# Patient Record
Sex: FEMALE | Race: WHITE | Employment: FULL TIME | URBAN - METROPOLITAN AREA
[De-identification: names, ages, dates, MRNs, and addresses within clinical notes are randomized per-mention and may not be internally consistent; named-entity substitution may affect disease eponyms.]

---

## 2017-01-04 ENCOUNTER — OFFICE VISIT (OUTPATIENT)
Dept: SURGERY | Facility: CLINIC | Age: 47
End: 2017-01-04

## 2017-01-04 ENCOUNTER — HOSPITAL ENCOUNTER (OUTPATIENT)
Dept: GENERAL RADIOLOGY | Facility: HOSPITAL | Age: 47
Discharge: HOME | End: 2017-01-04
Attending: NURSE PRACTITIONER
Payer: COMMERCIAL

## 2017-01-04 VITALS — SYSTOLIC BLOOD PRESSURE: 122 MMHG | RESPIRATION RATE: 18 BRPM | HEART RATE: 84 BPM | DIASTOLIC BLOOD PRESSURE: 78 MMHG

## 2017-01-04 DIAGNOSIS — M50.30 DDD (DEGENERATIVE DISC DISEASE), CERVICAL: ICD-10-CM

## 2017-01-04 DIAGNOSIS — Z98.1 S/P CERVICAL SPINAL FUSION: ICD-10-CM

## 2017-01-04 DIAGNOSIS — Z98.1 S/P CERVICAL SPINAL FUSION: Primary | ICD-10-CM

## 2017-01-04 DIAGNOSIS — G95.9 CERVICAL MYELOPATHY (HCC): ICD-10-CM

## 2017-01-04 DIAGNOSIS — M79.18 MYOFASCIAL PAIN SYNDROME, CERVICAL: ICD-10-CM

## 2017-01-04 PROCEDURE — 99024 POSTOP FOLLOW-UP VISIT: CPT | Performed by: NURSE PRACTITIONER

## 2017-01-04 PROCEDURE — 72040 X-RAY EXAM NECK SPINE 2-3 VW: CPT

## 2017-01-04 RX ORDER — ACETAMINOPHEN 500 MG
500 TABLET ORAL EVERY 6 HOURS PRN
COMMUNITY
End: 2017-02-16

## 2017-01-04 NOTE — PROGRESS NOTES
Patient f/u after completing xrays. Patient states she has had improvements since last visit. Patient stated she gets muscle soreness daily towards the end of the day across shoulders.

## 2017-01-04 NOTE — PROGRESS NOTES
Neurosurgery Cervical  Follow up      Maria Del Carmen Davidson PCP:  Jesus Infante MD    1970 MRN [id number]         Patient presents with:  Post-Op: f/u with xrays  Post-Op: 10/23/16 Anterior Cervical discectomy and fusion cervical4-7, partial vertebrectomy Extension   Finger extension  Intrinsics    Right          5        5         5           5  5  5  4   Left          5        5         5          5 5  5 5      Lower extremity strength:       Iliopsoas   Hamstrings    Gallo Caret D-flexion  P-flexion    [employer] [address], [address] [facility], [address]  [phone]  1/4/2017    I have seen and examined the patient along with AICHA Peraza. I agree with the assessment, plan, and examination.   Imaging was reviewed the patient shows adequate fusion construct in the b

## 2017-01-04 NOTE — PATIENT INSTRUCTIONS
Refill policies:    • Allow 2 business days for refills; controlled substances may take longer.   • Contact your pharmacy at least 5 days prior to running out of medication and have them send an electronic request or submit request through the “request re your physician has recommended that you have a procedure or additional testing performed. [facility]) will contact your insurance carrier to obtain pre-certification or prior authorization.     Unfortunately, DAWSON has seen an increas

## 2017-01-09 ENCOUNTER — OFFICE VISIT (OUTPATIENT)
Dept: PHYSICAL THERAPY | Age: 47
End: 2017-01-09
Attending: NURSE PRACTITIONER
Payer: COMMERCIAL

## 2017-01-09 DIAGNOSIS — G95.9 CERVICAL MYELOPATHY (HCC): ICD-10-CM

## 2017-01-09 DIAGNOSIS — Z98.1 S/P CERVICAL SPINAL FUSION: Primary | ICD-10-CM

## 2017-01-09 DIAGNOSIS — M50.30 DDD (DEGENERATIVE DISC DISEASE), CERVICAL: ICD-10-CM

## 2017-01-09 PROCEDURE — 97110 THERAPEUTIC EXERCISES: CPT | Performed by: PHYSICAL THERAPIST

## 2017-01-09 PROCEDURE — 97162 PT EVAL MOD COMPLEX 30 MIN: CPT | Performed by: PHYSICAL THERAPIST

## 2017-01-09 NOTE — PROGRESS NOTES
SPINE EVALUATION:   Referring Physician: Dr. Lexus Klein  Diagnosis: S/P cervical spinal fusion (Z98.1), Cervical myelopathy (G95.9), DDD (degenerative disc disease), cervical (M50.30)     Date of Service: 1/9/2017     PATIENT SUMMARY   Ivett Kimberlyn is a 40 ye Cervical ROM:    Evaluation   Flexion 38   Extension 36    Right          Left   Side Bending  26              32   Rotation 48              47     Shoulder Screen:  WNL    Palpation: Palpable tenderness in the upper trapezius, levator and cervical par include: Manual Therapy; Therapeutic Exercises; Neuromuscular Re-education; Therapeutic Activity; Electrical Stim; modalities as needed.     Education or treatment limitation: None  Rehab Potential:good    Physical Functional status measure:  51     Patient

## 2017-01-11 ENCOUNTER — OFFICE VISIT (OUTPATIENT)
Dept: PHYSICAL THERAPY | Age: 47
End: 2017-01-11
Attending: NURSE PRACTITIONER
Payer: COMMERCIAL

## 2017-01-11 DIAGNOSIS — M50.30 DDD (DEGENERATIVE DISC DISEASE), CERVICAL: ICD-10-CM

## 2017-01-11 DIAGNOSIS — G95.9 CERVICAL MYELOPATHY (HCC): ICD-10-CM

## 2017-01-11 DIAGNOSIS — Z98.1 S/P CERVICAL SPINAL FUSION: Primary | ICD-10-CM

## 2017-01-11 PROCEDURE — 97112 NEUROMUSCULAR REEDUCATION: CPT | Performed by: PHYSICAL THERAPIST

## 2017-01-11 PROCEDURE — 97140 MANUAL THERAPY 1/> REGIONS: CPT | Performed by: PHYSICAL THERAPIST

## 2017-01-11 PROCEDURE — 97110 THERAPEUTIC EXERCISES: CPT | Performed by: PHYSICAL THERAPIST

## 2017-01-11 NOTE — PROGRESS NOTES
Referring Physician: Dr. Kira Alfaro    Dx: S/P cervical spinal fusion (Z98.1), Cervical myelopathy (G95.9), DDD (degenerative disc disease), cervical (M50.30)           Authorized # of Visits:  12         Next MD visit: none scheduled  Fall Risk: standard Charges: TE x 1, NMR x 1, MT x 1       Total Timed Treatment: 40 min  Total Treatment Time: 42 min

## 2017-01-16 ENCOUNTER — OFFICE VISIT (OUTPATIENT)
Dept: PHYSICAL THERAPY | Age: 47
End: 2017-01-16
Attending: NURSE PRACTITIONER
Payer: COMMERCIAL

## 2017-01-16 DIAGNOSIS — G95.9 CERVICAL MYELOPATHY (HCC): ICD-10-CM

## 2017-01-16 DIAGNOSIS — M50.30 DDD (DEGENERATIVE DISC DISEASE), CERVICAL: ICD-10-CM

## 2017-01-16 DIAGNOSIS — Z98.1 S/P CERVICAL SPINAL FUSION: Primary | ICD-10-CM

## 2017-01-16 PROCEDURE — 97140 MANUAL THERAPY 1/> REGIONS: CPT | Performed by: PHYSICAL THERAPIST

## 2017-01-16 PROCEDURE — 97112 NEUROMUSCULAR REEDUCATION: CPT | Performed by: PHYSICAL THERAPIST

## 2017-01-16 PROCEDURE — 97110 THERAPEUTIC EXERCISES: CPT | Performed by: PHYSICAL THERAPIST

## 2017-01-16 NOTE — PROGRESS NOTES
Referring Physician: Dr. Desiree Hi    Dx: S/P cervical spinal fusion (Z98.1), Cervical myelopathy (G95.9), DDD (degenerative disc disease), cervical (M50.30)           Authorized # of Visits:  12         Next MD visit: none scheduled  Fall Risk: standard Tandem walking x 1 L X fwd / bwd         FSU & SLS with scaption x 10, 3 s          Charges: TE x 1, NMR x 1, MT x 1       Total Timed Treatment: 42 min  Total Treatment Time: 48 min

## 2017-01-18 ENCOUNTER — OFFICE VISIT (OUTPATIENT)
Dept: PHYSICAL THERAPY | Age: 47
End: 2017-01-18
Attending: NURSE PRACTITIONER
Payer: COMMERCIAL

## 2017-01-18 DIAGNOSIS — G95.9 CERVICAL MYELOPATHY (HCC): ICD-10-CM

## 2017-01-18 DIAGNOSIS — M50.30 DDD (DEGENERATIVE DISC DISEASE), CERVICAL: ICD-10-CM

## 2017-01-18 DIAGNOSIS — Z98.1 S/P CERVICAL SPINAL FUSION: Primary | ICD-10-CM

## 2017-01-18 PROCEDURE — 97140 MANUAL THERAPY 1/> REGIONS: CPT | Performed by: PHYSICAL THERAPIST

## 2017-01-18 PROCEDURE — 97112 NEUROMUSCULAR REEDUCATION: CPT | Performed by: PHYSICAL THERAPIST

## 2017-01-18 PROCEDURE — 97110 THERAPEUTIC EXERCISES: CPT | Performed by: PHYSICAL THERAPIST

## 2017-01-18 NOTE — PROGRESS NOTES
Referring Physician: Dr. Garg Speak    Dx: S/P cervical spinal fusion (Z98.1), Cervical myelopathy (G95.9), DDD (degenerative disc disease), cervical (M50.30)           Authorized # of Visits:  12         Next MD visit: none scheduled  Fall Risk: standard x 15' x x        isometric in all direction for cervical x x       Gentle passive UT stretch (B) x x       Tandem walking x 1 L X fwd / bwd -        FSU & SLS with scaption x 10, 3 s -         Charges: TE x 1, NMR x 1, MT x 1       Total Timed Treatment: 4

## 2017-01-23 ENCOUNTER — TELEPHONE (OUTPATIENT)
Dept: SURGERY | Facility: CLINIC | Age: 47
End: 2017-01-23

## 2017-01-23 DIAGNOSIS — Z98.1 S/P CERVICAL SPINAL FUSION: Primary | ICD-10-CM

## 2017-01-23 NOTE — TELEPHONE ENCOUNTER
Spoke with  who would like patient to get cervical xray AP and lat and a plain cervical MRI. Patient will need to f/u in the office to review imaging and be evaluated.     Patient was informed of the above and will call central scheduling to get cooper

## 2017-01-23 NOTE — TELEPHONE ENCOUNTER
Spoke with patient who stated the last few days she has been experiencing numbness/tingling to her arms which has not happened since before surgery.  (Surgery was a Anterior Cervical Fusion C4-C5, C5-C6, C6-C7 on 10/23/16 She also has been experiencing weak

## 2017-01-24 ENCOUNTER — TELEPHONE (OUTPATIENT)
Dept: NEUROLOGY | Facility: CLINIC | Age: 47
End: 2017-01-24

## 2017-01-25 ENCOUNTER — HOSPITAL ENCOUNTER (OUTPATIENT)
Dept: MRI IMAGING | Age: 47
Discharge: HOME | End: 2017-01-25
Attending: NEUROLOGICAL SURGERY
Payer: COMMERCIAL

## 2017-01-25 ENCOUNTER — OFFICE VISIT (OUTPATIENT)
Dept: SURGERY | Facility: CLINIC | Age: 47
End: 2017-01-25

## 2017-01-25 ENCOUNTER — APPOINTMENT (OUTPATIENT)
Dept: PHYSICAL THERAPY | Age: 47
End: 2017-01-25
Attending: NURSE PRACTITIONER
Payer: COMMERCIAL

## 2017-01-25 ENCOUNTER — TELEPHONE (OUTPATIENT)
Dept: SURGERY | Facility: CLINIC | Age: 47
End: 2017-01-25

## 2017-01-25 ENCOUNTER — HOSPITAL ENCOUNTER (OUTPATIENT)
Dept: GENERAL RADIOLOGY | Age: 47
Discharge: HOME | End: 2017-01-25
Attending: NEUROLOGICAL SURGERY
Payer: COMMERCIAL

## 2017-01-25 ENCOUNTER — HOSPITAL ENCOUNTER (OUTPATIENT)
Dept: MRI IMAGING | Facility: HOSPITAL | Age: 47
Discharge: HOME | End: 2017-01-25
Attending: NEUROLOGICAL SURGERY
Payer: COMMERCIAL

## 2017-01-25 VITALS
DIASTOLIC BLOOD PRESSURE: 78 MMHG | BODY MASS INDEX: 28.93 KG/M2 | SYSTOLIC BLOOD PRESSURE: 122 MMHG | HEIGHT: 66 IN | RESPIRATION RATE: 15 BRPM | WEIGHT: 180 LBS | HEART RATE: 78 BPM

## 2017-01-25 VITALS
SYSTOLIC BLOOD PRESSURE: 120 MMHG | WEIGHT: 180 LBS | HEART RATE: 84 BPM | RESPIRATION RATE: 14 BRPM | DIASTOLIC BLOOD PRESSURE: 68 MMHG | BODY MASS INDEX: 28.93 KG/M2 | HEIGHT: 66 IN

## 2017-01-25 DIAGNOSIS — Z98.1 S/P CERVICAL SPINAL FUSION: ICD-10-CM

## 2017-01-25 DIAGNOSIS — M54.12 CERVICAL RADICULOPATHY: Primary | ICD-10-CM

## 2017-01-25 DIAGNOSIS — M47.812 FACET ARTHROPATHY, CERVICAL: ICD-10-CM

## 2017-01-25 DIAGNOSIS — S14.105D SPINAL CORD INJURY AT C5-C7 LEVEL WITHOUT INJURY OF SPINAL BONE, SUBSEQUENT ENCOUNTER (HCC): ICD-10-CM

## 2017-01-25 DIAGNOSIS — R53.1 WEAKNESS: ICD-10-CM

## 2017-01-25 DIAGNOSIS — Z98.890 POST-OPERATIVE STATE: ICD-10-CM

## 2017-01-25 DIAGNOSIS — S14.105D SPINAL CORD INJURY AT C5-C7 LEVEL WITHOUT INJURY OF SPINAL BONE, SUBSEQUENT ENCOUNTER (HCC): Primary | ICD-10-CM

## 2017-01-25 DIAGNOSIS — G95.9 CERVICAL MYELOPATHY (HCC): ICD-10-CM

## 2017-01-25 PROCEDURE — 72141 MRI NECK SPINE W/O DYE: CPT

## 2017-01-25 PROCEDURE — 99204 OFFICE O/P NEW MOD 45 MIN: CPT | Performed by: ANESTHESIOLOGY

## 2017-01-25 PROCEDURE — 99024 POSTOP FOLLOW-UP VISIT: CPT | Performed by: NEUROLOGICAL SURGERY

## 2017-01-25 PROCEDURE — A9575 INJ GADOTERATE MEGLUMI 0.1ML: HCPCS | Performed by: NEUROLOGICAL SURGERY

## 2017-01-25 PROCEDURE — 72040 X-RAY EXAM NECK SPINE 2-3 VW: CPT

## 2017-01-25 PROCEDURE — 72156 MRI NECK SPINE W/O & W/DYE: CPT

## 2017-01-25 RX ORDER — ORPHENADRINE CITRATE 100 MG/1
100 TABLET, EXTENDED RELEASE ORAL 2 TIMES DAILY PRN
COMMUNITY
End: 2017-03-08

## 2017-01-25 NOTE — PROGRESS NOTES
PT is here to follow up after imaging due to new symptoms  Weakness in leg   Arms numb and tingling particularly when laying down  Dropping things

## 2017-01-25 NOTE — TELEPHONE ENCOUNTER
Spoke with MRI department to clarify order. Informed them that per TE 1/23/17 Seferino Peabody had wanted a plain MRI cervical. No further questions.

## 2017-01-25 NOTE — TELEPHONE ENCOUNTER
Pt is scheduled, MRI Cervical ordered by Dr. Shannon Fuller has been approved by your insurance, authorization # [id number] and is approved through 1.24.17-2.22.17. Please have procedure completed before 2.22.17.  Schedule at your convenience with the central schedul

## 2017-01-25 NOTE — PATIENT INSTRUCTIONS
Refill policies:    • Allow 2 business days for refills; controlled substances may take longer.   • Contact your pharmacy at least 5 days prior to running out of medication and have them send an electronic request or submit request through the “request re your physician has recommended that you have a procedure or additional testing performed. [facility]) will contact your insurance carrier to obtain pre-certification or prior authorization.     Unfortunately, DAWSON has seen an increas

## 2017-01-25 NOTE — PROGRESS NOTES
HPI:    Patient ID: Shikha Talavera is a 40year old female. HPI    Review of Systems         Current Outpatient Prescriptions:  Orphenadrine Citrate  MG Oral Tablet 12 Hr Take 100 mg by mouth 2 (two) times daily as needed.  Disp:  Rfl:    acetamino

## 2017-01-25 NOTE — PATIENT INSTRUCTIONS
Refill policies:    • Allow 2 business days for refills; controlled substances may take longer.   • Contact your pharmacy at least 5 days prior to running out of medication and have them send an electronic request or submit request through the “request re your physician has recommended that you have a procedure or additional testing performed. [facility]) will contact your insurance carrier to obtain pre-certification or prior authorization.     Unfortunately, DAWSON has seen an increas

## 2017-01-26 ENCOUNTER — OFFICE VISIT (OUTPATIENT)
Dept: NEUROLOGY | Facility: CLINIC | Age: 47
End: 2017-01-26

## 2017-01-26 ENCOUNTER — OFFICE VISIT (OUTPATIENT)
Dept: PHYSICAL THERAPY | Age: 47
End: 2017-01-26
Attending: NURSE PRACTITIONER
Payer: COMMERCIAL

## 2017-01-26 ENCOUNTER — APPOINTMENT (OUTPATIENT)
Dept: LAB | Age: 47
End: 2017-01-26
Attending: Other
Payer: COMMERCIAL

## 2017-01-26 VITALS
RESPIRATION RATE: 16 BRPM | DIASTOLIC BLOOD PRESSURE: 80 MMHG | HEART RATE: 72 BPM | HEIGHT: 66 IN | WEIGHT: 180 LBS | BODY MASS INDEX: 28.93 KG/M2 | SYSTOLIC BLOOD PRESSURE: 118 MMHG

## 2017-01-26 DIAGNOSIS — M47.12 CERVICAL SPONDYLOSIS WITH MYELOPATHY: ICD-10-CM

## 2017-01-26 DIAGNOSIS — R29.898 WEAKNESS OF RIGHT LOWER EXTREMITY: ICD-10-CM

## 2017-01-26 DIAGNOSIS — M47.12 CERVICAL SPONDYLOSIS WITH MYELOPATHY: Primary | ICD-10-CM

## 2017-01-26 LAB
ERYTHROCYTE [DISTWIDTH] IN BLOOD BY AUTOMATED COUNT: 12.7 % (ref 11.5–16)
HCT VFR BLD AUTO: 39.8 % (ref 34–50)
HGB BLD-MCNC: 13.7 G/DL (ref 12–16)
MCH RBC QN AUTO: 29.7 PG (ref 27–33.2)
MCHC RBC AUTO-ENTMCNC: 34.4 G/DL (ref 31–37)
MCV RBC AUTO: 86.3 FL (ref 81–100)
PLATELET # BLD AUTO: 255 10(3)UL (ref 150–450)
RBC # BLD AUTO: 4.61 X10(6)UL (ref 3.8–5.1)
RED CELL DISTRIBUTION WIDTH-SD: 39.7 FL (ref 35.1–46.3)
WBC # BLD AUTO: 5.3 X10(3) UL (ref 4–13)

## 2017-01-26 PROCEDURE — 84165 PROTEIN E-PHORESIS SERUM: CPT

## 2017-01-26 PROCEDURE — 36415 COLL VENOUS BLD VENIPUNCTURE: CPT

## 2017-01-26 PROCEDURE — 83883 ASSAY NEPHELOMETRY NOT SPEC: CPT

## 2017-01-26 PROCEDURE — 86334 IMMUNOFIX E-PHORESIS SERUM: CPT

## 2017-01-26 PROCEDURE — 97112 NEUROMUSCULAR REEDUCATION: CPT | Performed by: PHYSICAL THERAPIST

## 2017-01-26 PROCEDURE — 97140 MANUAL THERAPY 1/> REGIONS: CPT | Performed by: PHYSICAL THERAPIST

## 2017-01-26 PROCEDURE — 97110 THERAPEUTIC EXERCISES: CPT | Performed by: PHYSICAL THERAPIST

## 2017-01-26 PROCEDURE — 82164 ANGIOTENSIN I ENZYME TEST: CPT

## 2017-01-26 PROCEDURE — 99215 OFFICE O/P EST HI 40 MIN: CPT | Performed by: OTHER

## 2017-01-26 PROCEDURE — 85027 COMPLETE CBC AUTOMATED: CPT

## 2017-01-26 PROCEDURE — 86618 LYME DISEASE ANTIBODY: CPT

## 2017-01-26 NOTE — PROGRESS NOTES
States symptoms improved after surgery but now are returning. C/o numbness, tingling and weakness in bilateral arms. States \"I am dropping things\". Also numbness right leg.

## 2017-01-26 NOTE — PROGRESS NOTES
[facility] in [place]  Neurology - Clinic Follow up  2017, 10:29 AM     Shikha Talavera Patient Status:  No patient class for patient encounter    1970 MRN [id number]   Location [unfilled] PCP Ang Lynn MD     Patient pr hours as needed for Pain., Disp: , Rfl:   •  Desvenlafaxine Succinate ER (PRISTIQ) 50 MG Oral Tablet 24 Hr, Take 1 tablet by mouth daily. , Disp: 90 tablet, Rfl: 4    REVIEW OF SYSTEMS:  General: denies any fever or chills.      Eye: no pain or redness;  Ear tendon reflexes were 3 at the biceps, brachioradialis, triceps, with both pectoral spread and finger flexion spread with brachioradialis b/l, 3+ knee jerk, and ankle jerk.  Plantar responses were flexor bilaterally.    Sensory exam revealed normal light renny imaging with neuroradiology. Will check ACE, SPEP, and Lyme, as well as obtain CSF testing. No prescriptions requested or ordered in this encounter       We discussed in depth regarding the diagnosis, prognosis, treatment.   The patient was given ample o

## 2017-01-26 NOTE — PROGRESS NOTES
Referring Physician: Dr. Satya Medina    Dx: S/P cervical spinal fusion (Z98.1), Cervical myelopathy (G95.9), DDD (degenerative disc disease), cervical (M50.30)           Authorized # of Visits:  12         Next MD visit: none scheduled  Fall Risk: standard Date:   Tx#: 6/ Date: Tx#: 7/ Date:    Tx#: 8/   TRX rows x 20 x X and TRX biceps curls SL  X 20 TRX rows, biceps curls x 20      Wall snow julieta stretch x 5 - edu- pacing and activity modification Edu- see assessment      Wall - W x 20 X  x X 20        s

## 2017-01-26 NOTE — PATIENT INSTRUCTIONS
Refill policies:    • Allow 2 business days for refills; controlled substances may take longer.   • Contact your pharmacy at least 5 days prior to running out of medication and have them send an electronic request or submit request through the “request re your physician has recommended that you have a procedure or additional testing performed. [facility]) will contact your insurance carrier to obtain pre-certification or prior authorization.     Unfortunately, DAWSON has seen an increas

## 2017-01-26 NOTE — PROGRESS NOTES
[facility]   Outpatient Neurological Surgery Follow Up    Frandy Mg  : 1970  PCP: Stevan Jaffe MD  Referring Provider: No ref.  provider found    REASON FOR VISIT:Patient presents with:  Weakness: follow up after Tromner sign. She does have 3 beats of clonus and right ankle jerk testing and 2 beats in the left ankle jerk testing. She continues to have hyperreflexia in the bilateral lower extremities graded as 3 out of 4.   Her deep tendon reflexes in the upper ext manifestation such as demyelinating processes. Multiple sclerosis still should remain on the differential although she does not have the imaging characteristics to meet that diagnosis at this time.   Tumor of the central cervical cord at this level may hav

## 2017-01-27 ENCOUNTER — TELEPHONE (OUTPATIENT)
Dept: SURGERY | Facility: CLINIC | Age: 47
End: 2017-01-27

## 2017-01-27 ENCOUNTER — TELEPHONE (OUTPATIENT)
Dept: NEUROLOGY | Facility: CLINIC | Age: 47
End: 2017-01-27

## 2017-01-27 ENCOUNTER — TELEPHONE (OUTPATIENT)
Dept: FAMILY MEDICINE CLINIC | Facility: CLINIC | Age: 47
End: 2017-01-27

## 2017-01-27 LAB — ANGIOTENSIN CONVERTING ENZYME: 15 U/L

## 2017-01-27 NOTE — TELEPHONE ENCOUNTER
[facility]  and reviewed message below.  stated he reviewed patient's 2nd MRI that was completed on 1/25/17 and stated there was nothing alarming.  Patient's imaging is going to be reviewed on 1/31/17 at neuro-onc conference and

## 2017-01-27 NOTE — TELEPHONE ENCOUNTER
Patient was informed of message below from [facility]. Patient understood and was thankful for the call back. No further questions at this time.

## 2017-01-27 NOTE — TELEPHONE ENCOUNTER
Pt is scheduled,MRA Neck ordered by Dr. Krissy Rdz has been approved by your insurance, authorization # [id number] and is approved through 1.27.17-2.25.17. Please have procedure completed before 2.25.17.  Schedule at your convenience with the central schedulin

## 2017-01-27 NOTE — TELEPHONE ENCOUNTER
Spoke with patient to try to clarify information. Patient stated She saw Spencer Martinez on 1/25/17 and then saw  the following day on 1/26/17. Patient stated as of this morning she is having some pain to the middle of her lower back and is a 5/10.  Sta

## 2017-01-28 LAB — BORRELIA BURGDORFERI ABS, ELISA: 0.8 LIV

## 2017-01-28 NOTE — PROGRESS NOTES
Name: Frandy Mg   : 1970   DOS: 2017     Chief complaint: Neck Pain    History of present illness:  Frandy Mg is a 40year old female complaining of pain in the neck for 3 years. She is status post cervical fusion.   Her presurgical p UNLISTED  10/23/16    Comment C4-7 ACDF, Dr. Salmeron Core      Family History   Problem Relation Age of Onset   • Alcohol and Other Disorders Associated Father      Alcoholism   • Hypertension Other      Family history of        Smoking Status: Never Smoker and trapezius muscles. Flexion of the neck makes the pain better, extention and lateral rotation of the neck makes the pain worse. Elevation of the head makes the pain better, compression of  the head makes the pain worse. Spurling’s test is negative.  Christian N   Wolfe:    N    N   Ankle Clonus:    N                          N  Sensory Examination:    (R)   (L)   LI:      N                          N   L2:      N     N   L3:      N               N   L4:      N                          N   L5:      N

## 2017-01-29 LAB
A/G RATIO: 1.45
ALBUMIN, SERUM: 4.62 G/DL (ref 3.5–4.8)
ALPHA-1 GLOBULIN: 0.2 G/DL (ref 0.1–0.3)
ALPHA-2 GLOBULIN: 0.86 G/DL (ref 0.6–1)
BETA GLOBULIN: 0.8 G/DL (ref 0.7–1.2)
GAMMA GLOBULIN: 1.32 G/DL (ref 0.6–1.6)
KAPPA FREE LIGHT CHAIN: 1.57 MG/DL (ref 0.33–1.94)
KAPPA/LAMBDA FLC RATIO: 0.98 (ref 0.26–1.65)
LAMBDA FREE LIGHT CHAIN: 1.59 MG/DL (ref 0.57–2.63)
TOTAL PROTEIN,SERUM: 7.8 G/DL (ref 6.1–8.3)

## 2017-01-30 ENCOUNTER — APPOINTMENT (OUTPATIENT)
Dept: PHYSICAL THERAPY | Age: 47
End: 2017-01-30
Attending: NURSE PRACTITIONER
Payer: COMMERCIAL

## 2017-01-31 ENCOUNTER — TELEPHONE (OUTPATIENT)
Dept: SURGERY | Facility: CLINIC | Age: 47
End: 2017-01-31

## 2017-01-31 DIAGNOSIS — Z98.1 S/P CERVICAL SPINAL FUSION: Primary | ICD-10-CM

## 2017-01-31 DIAGNOSIS — G95.20 CORD COMPRESSION MYELOPATHY (HCC): ICD-10-CM

## 2017-01-31 NOTE — TELEPHONE ENCOUNTER
Spoke w/ pt regarding recommendations from neuro-onc conference; cervical cord change does not appear to be a mass although will need additional imaging in 2 months to re-evaulate. Cord adequately decompressed with surgery.  Advised her that she should cont

## 2017-02-01 ENCOUNTER — APPOINTMENT (OUTPATIENT)
Dept: PHYSICAL THERAPY | Age: 47
End: 2017-02-01
Attending: NURSE PRACTITIONER
Payer: COMMERCIAL

## 2017-02-01 NOTE — IMAGING NOTE
Left a msg and pt informed to arrive 1 hr early, enter through [facility], NPO x 4 hours, needs a , will be here up to 6 hours total, call back with quesitons.

## 2017-02-02 ENCOUNTER — APPOINTMENT (OUTPATIENT)
Dept: LAB | Facility: HOSPITAL | Age: 47
End: 2017-02-02
Attending: Other
Payer: COMMERCIAL

## 2017-02-02 ENCOUNTER — HOSPITAL ENCOUNTER (OUTPATIENT)
Dept: GENERAL RADIOLOGY | Facility: HOSPITAL | Age: 47
Discharge: HOME | End: 2017-02-02
Attending: Other
Payer: COMMERCIAL

## 2017-02-02 VITALS
OXYGEN SATURATION: 99 % | DIASTOLIC BLOOD PRESSURE: 60 MMHG | SYSTOLIC BLOOD PRESSURE: 102 MMHG | TEMPERATURE: 98 F | WEIGHT: 180 LBS | RESPIRATION RATE: 18 BRPM | HEIGHT: 66 IN | HEART RATE: 95 BPM | BODY MASS INDEX: 28.93 KG/M2

## 2017-02-02 DIAGNOSIS — M47.12 CERVICAL SPONDYLOSIS WITH MYELOPATHY: Primary | ICD-10-CM

## 2017-02-02 DIAGNOSIS — M47.12 CERVICAL SPONDYLOSIS WITH MYELOPATHY: ICD-10-CM

## 2017-02-02 DIAGNOSIS — R29.898 WEAKNESS OF RIGHT LOWER EXTREMITY: ICD-10-CM

## 2017-02-02 LAB
CLARITY CSF: CLEAR
COLOR CSF: COLORLESS
COUNT PERFORMED ON TUBE: 4
GLUCOSE CSF: 54 MG/DL (ref 40–70)
INR BLD: 0.92 (ref 0.89–1.12)
NON GYNE INTERPRETATION: NEGATIVE
PSA SERPL DL<=0.01 NG/ML-MCNC: 12.6 SECONDS (ref 12.3–14.8)
RBC CSF: 2 /MM3
TOTAL PROTEIN CSF: 37 MG/DL (ref 15–45)
TOTAL VOLUME CSF: 12 ML
WBC # FLD MANUAL: 0 /MM3 (ref 0–5)

## 2017-02-02 PROCEDURE — 87070 CULTURE OTHR SPECIMN AEROBIC: CPT

## 2017-02-02 PROCEDURE — 88108 CYTOPATH CONCENTRATE TECH: CPT

## 2017-02-02 PROCEDURE — 77003 FLUOROGUIDE FOR SPINE INJECT: CPT

## 2017-02-02 PROCEDURE — 62270 DX LMBR SPI PNXR: CPT

## 2017-02-02 PROCEDURE — 89050 BODY FLUID CELL COUNT: CPT

## 2017-02-02 PROCEDURE — 84157 ASSAY OF PROTEIN OTHER: CPT

## 2017-02-02 PROCEDURE — 85610 PROTHROMBIN TIME: CPT

## 2017-02-02 PROCEDURE — 82945 GLUCOSE OTHER FLUID: CPT

## 2017-02-02 PROCEDURE — 83916 OLIGOCLONAL BANDS: CPT

## 2017-02-02 PROCEDURE — 36415 COLL VENOUS BLD VENIPUNCTURE: CPT

## 2017-02-02 PROCEDURE — 87205 SMEAR GRAM STAIN: CPT

## 2017-02-02 RX ORDER — SODIUM CHLORIDE 9 MG/ML
INJECTION, SOLUTION INTRAVENOUS CONTINUOUS
Status: DISCONTINUED | OUTPATIENT
Start: 2017-02-02 | End: 2017-02-06

## 2017-02-02 RX ORDER — HYDROCODONE BITARTRATE AND ACETAMINOPHEN 5; 325 MG/1; MG/1
1 TABLET ORAL EVERY 6 HOURS PRN
Status: DISCONTINUED | OUTPATIENT
Start: 2017-02-02 | End: 2017-02-06

## 2017-02-02 RX ADMIN — HYDROCODONE BITARTRATE AND ACETAMINOPHEN 1 TABLET: 5; 325 TABLET ORAL at 12:16:00

## 2017-02-02 NOTE — PROCEDURES
[facility]  Procedure Note    Chel Laureano Patient Status:  Outpatient    1970 MRN [id number]   Location  [address] Attending Ez Shin, [address] [facility] Day # 0 PCP Grace Cantu MD     PROCEDURE: XR LUMBAR PUNCTURE  (C

## 2017-02-02 NOTE — OR NURSING
PATIENT STATED CAME IN WITH HEADACHE, NORCO GIVEN WITH RELIEF PAIN DOWN TO 4 .  INSTRUCTED PATIENT TO LAY FLAT UNTIL AM. AND TO CALL RADIOLOGIST IF WORSENS

## 2017-02-04 LAB
CSF BAND OLIGOCLONAL: NEGATIVE
CSF OLIGOCLONAL BANDS NUMBER: 0 BANDS

## 2017-02-06 ENCOUNTER — HOSPITAL ENCOUNTER (OUTPATIENT)
Dept: MRI IMAGING | Facility: HOSPITAL | Age: 47
Discharge: HOME | End: 2017-02-06
Attending: Other
Payer: COMMERCIAL

## 2017-02-06 ENCOUNTER — APPOINTMENT (OUTPATIENT)
Dept: PHYSICAL THERAPY | Age: 47
End: 2017-02-06
Attending: NURSE PRACTITIONER
Payer: COMMERCIAL

## 2017-02-06 DIAGNOSIS — R29.898 WEAKNESS OF RIGHT LOWER EXTREMITY: ICD-10-CM

## 2017-02-06 DIAGNOSIS — M47.12 CERVICAL SPONDYLOSIS WITH MYELOPATHY: ICD-10-CM

## 2017-02-06 PROCEDURE — 72159 MR ANGIO SPINE W/O&W/DYE: CPT

## 2017-02-06 PROCEDURE — 72156 MRI NECK SPINE W/O & W/DYE: CPT

## 2017-02-06 PROCEDURE — A9575 INJ GADOTERATE MEGLUMI 0.1ML: HCPCS | Performed by: OTHER

## 2017-02-08 ENCOUNTER — OFFICE VISIT (OUTPATIENT)
Dept: SURGERY | Facility: CLINIC | Age: 47
End: 2017-02-08

## 2017-02-08 ENCOUNTER — APPOINTMENT (OUTPATIENT)
Dept: PHYSICAL THERAPY | Age: 47
End: 2017-02-08
Attending: NURSE PRACTITIONER
Payer: COMMERCIAL

## 2017-02-08 VITALS — SYSTOLIC BLOOD PRESSURE: 118 MMHG | RESPIRATION RATE: 18 BRPM | HEART RATE: 88 BPM | DIASTOLIC BLOOD PRESSURE: 78 MMHG

## 2017-02-08 DIAGNOSIS — G95.9 MYELOPATHY (HCC): Primary | ICD-10-CM

## 2017-02-08 DIAGNOSIS — M48.02 CERVICAL STENOSIS OF SPINAL CANAL: ICD-10-CM

## 2017-02-08 DIAGNOSIS — Z98.890 POST-OPERATIVE STATE: ICD-10-CM

## 2017-02-08 PROCEDURE — 99024 POSTOP FOLLOW-UP VISIT: CPT | Performed by: NEUROLOGICAL SURGERY

## 2017-02-08 RX ORDER — DESVENLAFAXINE SUCCINATE 50 MG/1
TABLET, EXTENDED RELEASE ORAL
Qty: 90 TABLET | Refills: 3 | Status: SHIPPED | OUTPATIENT
Start: 2017-02-08 | End: 2017-02-16

## 2017-02-08 NOTE — TELEPHONE ENCOUNTER
Requesting Pristiq   LOV: 11/02/16  RTC: none specified  Last Labs: n/a  Filled: 9/22/15 #90 with 4 refills    Medication was last discussed on 9/22/15    Future Appointments  Date Time Provider Robbie Thomas   2/8/2017 2:00 PM DO DAWSON Darby

## 2017-02-08 NOTE — PATIENT INSTRUCTIONS
Refill policies:    • Allow 2 business days for refills; controlled substances may take longer.   • Contact your pharmacy at least 5 days prior to running out of medication and have them send an electronic request or submit request through the “request re your physician has recommended that you have a procedure or additional testing performed. [facility]) will contact your insurance carrier to obtain pre-certification or prior authorization.     Unfortunately, DAWSON has seen an increas

## 2017-02-09 ENCOUNTER — TELEPHONE (OUTPATIENT)
Dept: NEUROLOGY | Facility: CLINIC | Age: 47
End: 2017-02-09

## 2017-02-10 ENCOUNTER — TELEPHONE (OUTPATIENT)
Dept: SURGERY | Facility: CLINIC | Age: 47
End: 2017-02-10

## 2017-02-10 NOTE — TELEPHONE ENCOUNTER
Patient called stating she needs an updated letter for work regarding restrictions. Informed patient I would verify letter with . Patient would like letter faxed to her ATTN: Estuardo Almonte fax #[phone].     Letter pended for approval/verification with

## 2017-02-13 ENCOUNTER — APPOINTMENT (OUTPATIENT)
Dept: PHYSICAL THERAPY | Age: 47
End: 2017-02-13
Attending: NURSE PRACTITIONER
Payer: COMMERCIAL

## 2017-02-13 ENCOUNTER — TELEPHONE (OUTPATIENT)
Dept: PHYSICAL THERAPY | Age: 47
End: 2017-02-13

## 2017-02-15 ENCOUNTER — OFFICE VISIT (OUTPATIENT)
Dept: PHYSICAL THERAPY | Age: 47
End: 2017-02-15
Attending: NURSE PRACTITIONER
Payer: COMMERCIAL

## 2017-02-15 PROCEDURE — 97110 THERAPEUTIC EXERCISES: CPT | Performed by: PHYSICAL THERAPIST

## 2017-02-15 PROCEDURE — 97140 MANUAL THERAPY 1/> REGIONS: CPT | Performed by: PHYSICAL THERAPIST

## 2017-02-15 PROCEDURE — 97112 NEUROMUSCULAR REEDUCATION: CPT | Performed by: PHYSICAL THERAPIST

## 2017-02-15 NOTE — PROGRESS NOTES
Referring Physician: Dr. Lexus Klein    Dx: S/P cervical spinal fusion (Z98.1), Cervical myelopathy (G95.9), DDD (degenerative disc disease), cervical (M50.30)           Authorized # of Visits:  12         Next MD visit: none scheduled  Fall Risk: standard hold      shd ext x 20 yellow tubing  shd ext & rows X green tubing  x X green tubing x 20      Nustep x 7' X and prone T, I, Y x 10 each SB x Nustep x 5' X 6' L-2.5     STM to cervical & thoracic ps x 15' x x X 10' X 12'      isometric in all direction fo

## 2017-02-16 ENCOUNTER — TELEPHONE (OUTPATIENT)
Dept: FAMILY MEDICINE CLINIC | Facility: CLINIC | Age: 47
End: 2017-02-16

## 2017-02-16 ENCOUNTER — HOSPITAL ENCOUNTER (OUTPATIENT)
Facility: HOSPITAL | Age: 47
Setting detail: OBSERVATION
Discharge: HOME | End: 2017-02-17
Attending: EMERGENCY MEDICINE | Admitting: INTERNAL MEDICINE
Payer: COMMERCIAL

## 2017-02-16 ENCOUNTER — APPOINTMENT (OUTPATIENT)
Dept: GENERAL RADIOLOGY | Facility: HOSPITAL | Age: 47
End: 2017-02-16
Attending: EMERGENCY MEDICINE
Payer: COMMERCIAL

## 2017-02-16 ENCOUNTER — APPOINTMENT (OUTPATIENT)
Dept: NUCLEAR MEDICINE | Facility: HOSPITAL | Age: 47
End: 2017-02-16
Attending: EMERGENCY MEDICINE
Payer: COMMERCIAL

## 2017-02-16 DIAGNOSIS — R09.1 PLEURISY: ICD-10-CM

## 2017-02-16 DIAGNOSIS — R07.9 ACUTE CHEST PAIN: Primary | ICD-10-CM

## 2017-02-16 DIAGNOSIS — D18.09 HEMANGIOMA OF BONE: ICD-10-CM

## 2017-02-16 DIAGNOSIS — M54.10 RADICULOPATHY, UNSPECIFIED SPINAL REGION: ICD-10-CM

## 2017-02-16 DIAGNOSIS — R09.02 HYPOXIA: ICD-10-CM

## 2017-02-16 LAB
ALBUMIN SERPL-MCNC: 4.1 G/DL (ref 3.5–4.8)
ALP LIVER SERPL-CCNC: 65 U/L (ref 37–98)
ALT SERPL-CCNC: 35 U/L (ref 14–54)
APTT PPP: 31.1 SECONDS (ref 25–34)
AST SERPL-CCNC: 20 U/L (ref 15–41)
BASOPHILS # BLD AUTO: 0.05 X10(3) UL (ref 0–0.1)
BASOPHILS NFR BLD AUTO: 0.9 %
BILIRUB SERPL-MCNC: 0.4 MG/DL (ref 0.1–2)
BUN BLD-MCNC: 13 MG/DL (ref 8–20)
CALCIUM BLD-MCNC: 8.6 MG/DL (ref 8.3–10.3)
CHLORIDE: 104 MMOL/L (ref 101–111)
CO2: 28 MMOL/L (ref 22–32)
CREAT BLD-MCNC: 0.79 MG/DL (ref 0.55–1.02)
D-DIMER: 0.3 UG/ML FEU (ref 0–0.49)
EOSINOPHIL # BLD AUTO: 0.1 X10(3) UL (ref 0–0.3)
EOSINOPHIL NFR BLD AUTO: 1.7 %
ERYTHROCYTE [DISTWIDTH] IN BLOOD BY AUTOMATED COUNT: 12.9 % (ref 11.5–16)
GLUCOSE BLD-MCNC: 89 MG/DL (ref 70–99)
HCT VFR BLD AUTO: 40.7 % (ref 34–50)
HGB BLD-MCNC: 14 G/DL (ref 12–16)
IMMATURE GRANULOCYTE COUNT: 0.01 X10(3) UL (ref 0–1)
IMMATURE GRANULOCYTE RATIO %: 0.2 %
INR BLD: 0.87 (ref 0.89–1.12)
LYMPHOCYTES # BLD AUTO: 2.07 X10(3) UL (ref 0.9–4)
LYMPHOCYTES NFR BLD AUTO: 35.9 %
M PROTEIN MFR SERPL ELPH: 8 G/DL (ref 6.1–8.3)
MCH RBC QN AUTO: 29.4 PG (ref 27–33.2)
MCHC RBC AUTO-ENTMCNC: 34.4 G/DL (ref 31–37)
MCV RBC AUTO: 85.3 FL (ref 81–100)
MONOCYTES # BLD AUTO: 0.58 X10(3) UL (ref 0.1–0.6)
MONOCYTES NFR BLD AUTO: 10.1 %
NEUTROPHIL ABS PRELIM: 2.96 X10 (3) UL (ref 1.3–6.7)
NEUTROPHILS # BLD AUTO: 2.96 X10(3) UL (ref 1.3–6.7)
NEUTROPHILS NFR BLD AUTO: 51.2 %
PLATELET # BLD AUTO: 262 10(3)UL (ref 150–450)
POTASSIUM SERPL-SCNC: 3.8 MMOL/L (ref 3.6–5.1)
PSA SERPL DL<=0.01 NG/ML-MCNC: 12 SECONDS (ref 12.3–14.8)
RBC # BLD AUTO: 4.77 X10(6)UL (ref 3.8–5.1)
RED CELL DISTRIBUTION WIDTH-SD: 39.8 FL (ref 35.1–46.3)
SODIUM SERPL-SCNC: 141 MMOL/L (ref 136–144)
TROPONIN: <0.046 NG/ML (ref ?–0.05)
WBC # BLD AUTO: 5.8 X10(3) UL (ref 4–13)

## 2017-02-16 PROCEDURE — 80053 COMPREHEN METABOLIC PANEL: CPT | Performed by: EMERGENCY MEDICINE

## 2017-02-16 PROCEDURE — 99220 INITIAL OBSERVATION CARE,LEVL III: CPT | Performed by: INTERNAL MEDICINE

## 2017-02-16 PROCEDURE — 85378 FIBRIN DEGRADE SEMIQUANT: CPT | Performed by: EMERGENCY MEDICINE

## 2017-02-16 PROCEDURE — 85610 PROTHROMBIN TIME: CPT | Performed by: EMERGENCY MEDICINE

## 2017-02-16 PROCEDURE — 85730 THROMBOPLASTIN TIME PARTIAL: CPT | Performed by: EMERGENCY MEDICINE

## 2017-02-16 PROCEDURE — 84484 ASSAY OF TROPONIN QUANT: CPT | Performed by: EMERGENCY MEDICINE

## 2017-02-16 PROCEDURE — 85025 COMPLETE CBC W/AUTO DIFF WBC: CPT | Performed by: EMERGENCY MEDICINE

## 2017-02-16 PROCEDURE — 71010 XR CHEST AP PORTABLE  (CPT=71010): CPT

## 2017-02-16 PROCEDURE — 78582 LUNG VENTILAT&PERFUS IMAGING: CPT

## 2017-02-16 RX ORDER — NITROGLYCERIN 0.4 MG/1
0.4 TABLET SUBLINGUAL EVERY 5 MIN PRN
Status: DISCONTINUED | OUTPATIENT
Start: 2017-02-16 | End: 2017-02-17

## 2017-02-16 RX ORDER — ONDANSETRON 2 MG/ML
4 INJECTION INTRAMUSCULAR; INTRAVENOUS EVERY 6 HOURS PRN
Status: DISCONTINUED | OUTPATIENT
Start: 2017-02-16 | End: 2017-02-17

## 2017-02-16 RX ORDER — SODIUM CHLORIDE 9 MG/ML
1000 INJECTION, SOLUTION INTRAVENOUS ONCE
Status: COMPLETED | OUTPATIENT
Start: 2017-02-16 | End: 2017-02-16

## 2017-02-16 RX ORDER — HEPARIN SODIUM 5000 [USP'U]/ML
5000 INJECTION, SOLUTION INTRAVENOUS; SUBCUTANEOUS EVERY 8 HOURS
Status: DISCONTINUED | OUTPATIENT
Start: 2017-02-16 | End: 2017-02-17

## 2017-02-16 RX ORDER — ACETAMINOPHEN 325 MG/1
650 TABLET ORAL EVERY 6 HOURS PRN
Status: DISCONTINUED | OUTPATIENT
Start: 2017-02-16 | End: 2017-02-17

## 2017-02-16 RX ORDER — TEMAZEPAM 15 MG/1
15 CAPSULE ORAL NIGHTLY PRN
Status: DISCONTINUED | OUTPATIENT
Start: 2017-02-16 | End: 2017-02-17

## 2017-02-16 RX ORDER — DESVENLAFAXINE 50 MG/1
50 TABLET, EXTENDED RELEASE ORAL DAILY
COMMUNITY
End: 2018-02-19

## 2017-02-16 RX ORDER — SODIUM CHLORIDE 9 MG/ML
INJECTION, SOLUTION INTRAVENOUS CONTINUOUS
Status: ACTIVE | OUTPATIENT
Start: 2017-02-16 | End: 2017-02-16

## 2017-02-16 RX ORDER — ASPIRIN 81 MG/1
324 TABLET, CHEWABLE ORAL ONCE
Status: COMPLETED | OUTPATIENT
Start: 2017-02-16 | End: 2017-02-16

## 2017-02-16 RX ORDER — KETOROLAC TROMETHAMINE 30 MG/ML
30 INJECTION, SOLUTION INTRAMUSCULAR; INTRAVENOUS ONCE
Status: COMPLETED | OUTPATIENT
Start: 2017-02-16 | End: 2017-02-16

## 2017-02-16 RX ORDER — ASPIRIN 325 MG
325 TABLET ORAL DAILY
Status: DISCONTINUED | OUTPATIENT
Start: 2017-02-17 | End: 2017-02-17

## 2017-02-16 RX ORDER — ONDANSETRON 2 MG/ML
4 INJECTION INTRAMUSCULAR; INTRAVENOUS EVERY 4 HOURS PRN
Status: DISCONTINUED | OUTPATIENT
Start: 2017-02-16 | End: 2017-02-16

## 2017-02-16 RX ORDER — DESVENLAFAXINE 50 MG/1
50 TABLET, EXTENDED RELEASE ORAL DAILY
Status: DISCONTINUED | OUTPATIENT
Start: 2017-02-17 | End: 2017-02-17

## 2017-02-16 NOTE — H&P
[facility]  History and Physical     Frandy Mg Patient Status:  Emergency    1970 MRN [id number]   Location [address] [facility] Attending Nito Chris MD   Hosp Day # 0 PCP Stevan Jaffe MD     Chief Complaint: ivan to Encounter:  PRISTIQ 50 MG Oral Tablet 24 Hr TAKE 1 TABLET BY MOUTH EVERY DAY Disp: 90 tablet Rfl: 3   Orphenadrine Citrate  MG Oral Tablet 12 Hr Take 100 mg by mouth 2 (two) times daily as needed.  Disp:  Rfl:    acetaminophen 500 MG Oral Tab Take telemetry  2. Trend troponin  3. Check ECHO, lipids, A1c  4. Cardiology to eval in AM, monica stress test  2. Depression  1.  Continue home meds    Quality:  · DVT Prophylaxis: HSQ  · CODE status: Full  · Russo: none    Plan of care discussed with patient,

## 2017-02-16 NOTE — TELEPHONE ENCOUNTER
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 She is having a hard time breathing and can hardly form a sentence on the phone.    Phone given to school

## 2017-02-16 NOTE — ED PROVIDER NOTES
Patient Seen in: [facility] Emergency Department    History   Patient presents with:  Chest Pain Angina (cardiovascular)    Stated Complaint: chest pain    HPI    Patient is a 43-year-old female who presents emergency room with a history of chest di oz/week       2 Glasses of wine, 0 Standard drinks or equivalent per week       Comment: 2 glasses wine week      Review of Systems    Positive for stated complaint: chest pain  Other systems are as noted in HPI. Constitutional and vital signs reviewed. or sensory deficits appreciated. Cranial nerves II through XII are intact. Patient is answering all questions appropriately.         ED Course     Labs Reviewed   PROTHROMBIN TIME (PT) - Abnormal; Notable for the following:     PT 12.0 (*)     INR 0.87 (*) as noted on EKG Report. Rate: 78  Rhythm: Normal sinus rhythm  Reading: No acute ischemic change noted           MDM   Chest x-ray is unremarkable. VQ scan is unremarkable.       Patient had an IV line established and have blood work drawn including a CBC

## 2017-02-17 ENCOUNTER — APPOINTMENT (OUTPATIENT)
Dept: CV DIAGNOSTICS | Facility: HOSPITAL | Age: 47
End: 2017-02-17
Attending: NURSE PRACTITIONER
Payer: COMMERCIAL

## 2017-02-17 ENCOUNTER — APPOINTMENT (OUTPATIENT)
Dept: CV DIAGNOSTICS | Facility: HOSPITAL | Age: 47
End: 2017-02-17
Attending: INTERNAL MEDICINE
Payer: COMMERCIAL

## 2017-02-17 VITALS
HEIGHT: 66 IN | DIASTOLIC BLOOD PRESSURE: 76 MMHG | SYSTOLIC BLOOD PRESSURE: 128 MMHG | BODY MASS INDEX: 28.93 KG/M2 | WEIGHT: 180 LBS | TEMPERATURE: 98 F | RESPIRATION RATE: 18 BRPM | OXYGEN SATURATION: 99 % | HEART RATE: 81 BPM

## 2017-02-17 LAB
ATRIAL RATE: 78 BPM
CHOLEST SMN-MCNC: 184 MG/DL (ref ?–200)
CK: 59 IU/L (ref 26–192)
CK: 64 IU/L (ref 26–192)
EST. AVERAGE GLUCOSE BLD GHB EST-MCNC: 108 MG/DL (ref 68–126)
HBA1C MFR BLD HPLC: 5.4 % (ref ?–5.7)
HDLC SERPL-MCNC: 71 MG/DL (ref 45–?)
HDLC SERPL: 2.59 {RATIO} (ref ?–4.44)
LDLC SERPL CALC-MCNC: 94 MG/DL (ref ?–130)
NONHDLC SERPL-MCNC: 113 MG/DL (ref ?–130)
P AXIS: 14 DEGREES
P-R INTERVAL: 142 MS
Q-T INTERVAL: 370 MS
QRS DURATION: 82 MS
QTC CALCULATION (BEZET): 421 MS
R AXIS: 63 DEGREES
T AXIS: 46 DEGREES
TRIGLYCERIDES: 93 MG/DL (ref ?–150)
TROPONIN: <0.046 NG/ML (ref ?–0.05)
VENTRICULAR RATE: 78 BPM
VLDL: 19 MG/DL (ref 5–40)

## 2017-02-17 PROCEDURE — 99217 OBSERVATION CARE DISCHARGE: CPT | Performed by: HOSPITALIST

## 2017-02-17 PROCEDURE — 93306 TTE W/DOPPLER COMPLETE: CPT

## 2017-02-17 PROCEDURE — 93018 CV STRESS TEST I&R ONLY: CPT | Performed by: INTERNAL MEDICINE

## 2017-02-17 PROCEDURE — 93306 TTE W/DOPPLER COMPLETE: CPT | Performed by: INTERNAL MEDICINE

## 2017-02-17 PROCEDURE — 93350 STRESS TTE ONLY: CPT

## 2017-02-17 PROCEDURE — 93350 STRESS TTE ONLY: CPT | Performed by: INTERNAL MEDICINE

## 2017-02-17 PROCEDURE — 93017 CV STRESS TEST TRACING ONLY: CPT

## 2017-02-17 NOTE — PLAN OF CARE
Stress echo and echo normal.  Stable for discharge from a cardiac perspective. Further workup of chest/back pain per primary service.     Laura Mcdaniel NP   2/17/2017  3:23 PM

## 2017-02-17 NOTE — CONSULTS
[facility]    PATIENT'S NAME: Kai Venegas   ATTENDING PHYSICIAN: CORRINE Pagan Prior: La Nena Baxter M.D.    PATIENT ACCOUNT#:   [de-identified]    LOCATION:  [address] [place] [address]  MEDICAL RECORD #:   [id number]       DATE OF BIRTH:  [birth year]. MEDICATIONS:  Prior to admission are Pristiq and orphenadrine b.i.d. ALLERGIES:  Sulfa products. SOCIAL HISTORY:  She does not smoke. FAMILY HISTORY:  Negative for coronary disease.       REVIEW OF SYSTEMS:  Completed and other th the evaluation of those areas to the Primary Care service. 2.   Recent complex cervical spine surgery with a persistent spinal mass. 3.   Red skin lesion on the right back:  Shingles? ?    RECOMMENDATION:    1.   Echo is currently being done.   2.   Honey

## 2017-02-17 NOTE — PLAN OF CARE
D: Patient received orders for discharge.     A: Reviewed discharge instructions with patient, including follow up appointments and MRI ordered by Dr Precious Rutledge; reviewed discharge medications, no changes made; PIV removed; cardiac monitor removed; belongings re

## 2017-02-17 NOTE — PLAN OF CARE
CARDIOVASCULAR - ADULT    • Maintains optimal cardiac output and hemodynamic stability Progressing    • Absence of cardiac arrhythmias or at baseline Progressing            Assumed care of patient at 2300-She was admitted for CP-trop (-)x2 with 3rd in am.

## 2017-02-17 NOTE — CONSULTS
[facility] cardiology consult: full note dictated    39 y/o wf with no cardiac risk factors that i am asked to see for cp.  Yesterday afternoon at 2 she developed a sudden onset of mid right chest pain in the area of her right breast. This radiated around her

## 2017-02-17 NOTE — PAYOR COMM NOTE
Attending Physician: Hector Aguilar MD    Review Type: ADMISSION   Reviewer: Adrienne Romero       Date: February 17, 2017 - 2:07 PM  Payor: MATHIEU ACOSTA  Authorization Number: N/A  Admit date: 2/16/2017  3:13 PM       REVIEWER COMMENTS  Chief Complaint:   Elfredia Setter Carrie NASCIMENTO RN      ketorolac tromethamine (TORADOL) 30 MG/ML injection 30 mg     Date Action Dose Route User    2/16/2017 1520 Given 30 mg Intravenous Scott Vallejo RN      0.9%  NaCl infusion 125cc/hr    Date Action Dose Route User    2/16/2017 15

## 2017-02-17 NOTE — CM/SW NOTE
02/17/17 1500   CM/SW Screening   Referral Source Social Work (self-referral)   [facility] staff; Chart review;Nursing rounds   Patient's Mental Status Alert;Oriented   Patient's [address]   Patient lives with Spouse   Patient [address]

## 2017-02-17 NOTE — PROGRESS NOTES
Preliminary Stress Echo  Rest EKG NSR  HR 80 /78  Chito prot 6:00 min. EKG without changes  No arrhythmias noted. Denies cardiac symptoms.   Echo pending

## 2017-02-20 ENCOUNTER — OFFICE VISIT (OUTPATIENT)
Dept: PHYSICAL THERAPY | Age: 47
End: 2017-02-20
Attending: NURSE PRACTITIONER
Payer: COMMERCIAL

## 2017-02-20 PROCEDURE — 97110 THERAPEUTIC EXERCISES: CPT | Performed by: PHYSICAL THERAPIST

## 2017-02-20 PROCEDURE — 97112 NEUROMUSCULAR REEDUCATION: CPT | Performed by: PHYSICAL THERAPIST

## 2017-02-20 PROCEDURE — 97140 MANUAL THERAPY 1/> REGIONS: CPT | Performed by: PHYSICAL THERAPIST

## 2017-02-20 NOTE — PROGRESS NOTES
[facility]   Outpatient Neurological Surgery Follow Up    Maximo Fernandez  : 1970  PCP: Mikey Patel MD  Referring Provider: No ref.  provider found    REASON FOR VISIT:Patient presents with:  Test Results: f/u after i imaging with an MRI of the cervical spine with and without IV contrast in 3 months from this previous MRI. Total time spent 25 minutes. % of time spent educating: Greater than 50% spent on counseling, education, and coordination of care.     Saman Mariano

## 2017-02-20 NOTE — PROGRESS NOTES
Referring Physician: Dr. Pasquale Jiménez    Dx: S/P cervical spinal fusion (Z98.1), Cervical myelopathy (G95.9), DDD (degenerative disc disease), cervical (M50.30)           Authorized # of Visits:  12         Next MD visit: none scheduled  Fall Risk: standard pacing and activity modification Edu- see assessment FR: I, T x 15, 2# -    Wall - W x 20 X  x X 20  BOSU FSU x 10 (B) 5\" sec hold x     shd ext x 20 yellow tubing  shd ext & rows X green tubing  x X green tubing x 20  Bosu tandem Weight shifting x 1 min

## 2017-02-22 ENCOUNTER — OFFICE VISIT (OUTPATIENT)
Dept: PHYSICAL THERAPY | Age: 47
End: 2017-02-22
Attending: NURSE PRACTITIONER
Payer: COMMERCIAL

## 2017-02-22 PROCEDURE — 97112 NEUROMUSCULAR REEDUCATION: CPT | Performed by: PHYSICAL THERAPIST

## 2017-02-22 PROCEDURE — 97140 MANUAL THERAPY 1/> REGIONS: CPT | Performed by: PHYSICAL THERAPIST

## 2017-02-22 PROCEDURE — 97110 THERAPEUTIC EXERCISES: CPT | Performed by: PHYSICAL THERAPIST

## 2017-02-23 ENCOUNTER — HOSPITAL ENCOUNTER (OUTPATIENT)
Dept: MRI IMAGING | Age: 47
Discharge: HOME | End: 2017-02-23
Attending: NEUROLOGICAL SURGERY
Payer: COMMERCIAL

## 2017-02-23 DIAGNOSIS — M54.10 RADICULOPATHY, UNSPECIFIED SPINAL REGION: ICD-10-CM

## 2017-02-23 PROCEDURE — 72148 MRI LUMBAR SPINE W/O DYE: CPT

## 2017-02-23 NOTE — PROGRESS NOTES
Referring Physician: Dr. Katelyn Sanford    Dx: S/P cervical spinal fusion (Z98.1), Cervical myelopathy (G95.9), DDD (degenerative disc disease), cervical (M50.30)           Authorized # of Visits:  12         Next MD visit: none scheduled  Fall Risk: standard relief), HEP instruction and progression    Date: 1/11/2017  Tx#: 2/16 Date: 2/22/2017  Tx#: 8/ Date:    Tx#: 9/  Re-eval     TRX rows x 20 TRX rows SL x 20, TRX SL to squat x 10 (B)      Wall snow julieta stretch x 5 FR: I, T x 15, 2#, walking lunges OH lift

## 2017-02-23 NOTE — DISCHARGE SUMMARY
[facility]  DISCHARGE SUMMARY     Blanquita Llamas Patient Status:  Observation    1970 MRN [id number]   [facility] 2NE-A Attending No att. providers found   Hosp Day # 1 PCP Callie Reyes MD     Date of Admission: 2017  Date appointment:   Georgette Villegas MD  [address] [place] [address] Road  [facility] [address]  [phone]    On 2/24/2017  Follow up with Dr. Christiano Fisher on 2/24 at 10:30 as Dr. Terese Shaw is unavailable. Vital signs:       Physical Exam:    General: No acute distress.    Angella Tijerina

## 2017-02-24 ENCOUNTER — TELEPHONE (OUTPATIENT)
Dept: SURGERY | Facility: CLINIC | Age: 47
End: 2017-02-24

## 2017-02-24 DIAGNOSIS — G95.9 MYELOPATHY (HCC): Primary | ICD-10-CM

## 2017-02-24 DIAGNOSIS — M54.16 LUMBAR RADICULOPATHY: ICD-10-CM

## 2017-02-27 ENCOUNTER — OFFICE VISIT (OUTPATIENT)
Dept: PHYSICAL THERAPY | Age: 47
End: 2017-02-27
Attending: NURSE PRACTITIONER
Payer: COMMERCIAL

## 2017-02-27 PROCEDURE — 97140 MANUAL THERAPY 1/> REGIONS: CPT | Performed by: PHYSICAL THERAPIST

## 2017-02-27 PROCEDURE — 97112 NEUROMUSCULAR REEDUCATION: CPT | Performed by: PHYSICAL THERAPIST

## 2017-02-27 PROCEDURE — 97110 THERAPEUTIC EXERCISES: CPT | Performed by: PHYSICAL THERAPIST

## 2017-02-28 NOTE — TELEPHONE ENCOUNTER
Pt states she is returning a call, she is very upset she has not yet received the referral from Dr. Vishal Bolanos. Informed pt we would discuss with Dr. Vishal Bolanos as soon as he was back in the office.  States she needs call back within next few days with name of refer

## 2017-03-01 ENCOUNTER — OFFICE VISIT (OUTPATIENT)
Dept: PHYSICAL THERAPY | Age: 47
End: 2017-03-01
Attending: NURSE PRACTITIONER
Payer: COMMERCIAL

## 2017-03-01 PROCEDURE — 97112 NEUROMUSCULAR REEDUCATION: CPT | Performed by: PHYSICAL THERAPIST

## 2017-03-01 PROCEDURE — 97140 MANUAL THERAPY 1/> REGIONS: CPT | Performed by: PHYSICAL THERAPIST

## 2017-03-01 PROCEDURE — 97110 THERAPEUTIC EXERCISES: CPT | Performed by: PHYSICAL THERAPIST

## 2017-03-01 NOTE — PROGRESS NOTES
Referring Physician: Dr. Adrian Stroud     Dx: S/P cervical spinal fusion (Z98.1), Cervical myelopathy (G95.9), DDD (degenerative disc disease), cervical (M50.30)           Authorized # of Visits:  12         Next MD visit: none scheduled  Fall Ri and e-stim for pain relief), HEP instruction and progress.          Date: 1/11/2017  Tx#: 2/16 Date: 2/22/2017  Tx#: 8/ Date: 2/27/2017  Tx#: 9/  Re-eval Date: 3/1/2017  Tx#: 10    TRX rows x 20 TRX rows SL x 20, TRX SL to squat x 10 (B) Walking lunges & tr

## 2017-03-02 NOTE — TELEPHONE ENCOUNTER
Per Dr. Navarro Buck, patient to get scheduled for EMG of lower extremities for lumbar radiculopathy  Order placed, will have front staff contact patient to get scheduled.

## 2017-03-04 ENCOUNTER — OFFICE VISIT (OUTPATIENT)
Dept: FAMILY MEDICINE CLINIC | Facility: CLINIC | Age: 47
End: 2017-03-04

## 2017-03-04 VITALS
RESPIRATION RATE: 16 BRPM | HEART RATE: 68 BPM | DIASTOLIC BLOOD PRESSURE: 80 MMHG | BODY MASS INDEX: 30.22 KG/M2 | WEIGHT: 188 LBS | SYSTOLIC BLOOD PRESSURE: 124 MMHG | HEIGHT: 66 IN

## 2017-03-04 DIAGNOSIS — G95.9 CERVICAL MYELOPATHY (HCC): ICD-10-CM

## 2017-03-04 DIAGNOSIS — E66.9 OBESITY (BMI 30-39.9): ICD-10-CM

## 2017-03-04 DIAGNOSIS — R07.9 ACUTE CHEST PAIN: Primary | ICD-10-CM

## 2017-03-04 DIAGNOSIS — M48.02 CERVICAL STENOSIS OF SPINAL CANAL: ICD-10-CM

## 2017-03-04 DIAGNOSIS — Z98.1 S/P CERVICAL SPINAL FUSION: ICD-10-CM

## 2017-03-04 PROBLEM — R09.1 PLEURISY: Status: RESOLVED | Noted: 2017-02-16 | Resolved: 2017-03-04

## 2017-03-04 PROBLEM — M47.816 DJD (DEGENERATIVE JOINT DISEASE), LUMBAR: Status: ACTIVE | Noted: 2017-03-04

## 2017-03-04 PROBLEM — R09.02 HYPOXIA: Status: RESOLVED | Noted: 2017-02-16 | Resolved: 2017-03-04

## 2017-03-04 PROCEDURE — 99213 OFFICE O/P EST LOW 20 MIN: CPT | Performed by: INTERNAL MEDICINE

## 2017-03-04 RX ORDER — PHENTERMINE HYDROCHLORIDE 15 MG/1
15 CAPSULE ORAL EVERY MORNING
Qty: 90 CAPSULE | Refills: 1 | Status: SHIPPED | COMMUNITY
Start: 2017-03-04 | End: 2017-03-08

## 2017-03-04 RX ORDER — TOPIRAMATE 25 MG/1
25 TABLET ORAL DAILY
Qty: 90 TABLET | Refills: 1 | Status: SHIPPED | COMMUNITY
Start: 2017-03-04 | End: 2017-03-08

## 2017-03-04 RX ORDER — TOPIRAMATE 25 MG/1
25 TABLET ORAL DAILY
Qty: 90 TABLET | Refills: 1 | Status: SHIPPED | OUTPATIENT
Start: 2017-03-04 | End: 2017-03-04

## 2017-03-06 ENCOUNTER — OFFICE VISIT (OUTPATIENT)
Dept: PHYSICAL THERAPY | Age: 47
End: 2017-03-06
Attending: NURSE PRACTITIONER
Payer: COMMERCIAL

## 2017-03-06 PROCEDURE — 97112 NEUROMUSCULAR REEDUCATION: CPT | Performed by: PHYSICAL THERAPIST

## 2017-03-06 PROCEDURE — 97110 THERAPEUTIC EXERCISES: CPT | Performed by: PHYSICAL THERAPIST

## 2017-03-06 PROCEDURE — 97140 MANUAL THERAPY 1/> REGIONS: CPT | Performed by: PHYSICAL THERAPIST

## 2017-03-06 NOTE — PROGRESS NOTES
Referring Physician: Dr. Payam Buchanan     Dx: S/P cervical spinal fusion (Z98.1), Cervical myelopathy (G95.9), DDD (degenerative disc disease), cervical (M50.30)           Authorized # of Visits:  12         Next MD visit: none scheduled  Fall Ri training; Pt. education for posture, body mechanics, and ergonomics, Modalities as needed (including heat/cold and e-stim for pain relief), HEP instruction and progress.      Date: 1/11/2017  Tx#: 2/16 Date: 2/22/2017  Tx#: 8/ Date: 2/27/2017  Tx#: 9/  Re-e

## 2017-03-08 ENCOUNTER — OFFICE VISIT (OUTPATIENT)
Dept: SURGERY | Facility: CLINIC | Age: 47
End: 2017-03-08

## 2017-03-08 VITALS — SYSTOLIC BLOOD PRESSURE: 104 MMHG | RESPIRATION RATE: 14 BRPM | DIASTOLIC BLOOD PRESSURE: 80 MMHG | HEART RATE: 80 BPM

## 2017-03-08 DIAGNOSIS — G95.9 CERVICAL MYELOPATHY (HCC): ICD-10-CM

## 2017-03-08 DIAGNOSIS — Z98.890 POST-OPERATIVE STATE: Primary | ICD-10-CM

## 2017-03-08 DIAGNOSIS — M43.10 SPONDYLOLISTHESIS, GRADE 3: ICD-10-CM

## 2017-03-08 DIAGNOSIS — S14.105D SPINAL CORD INJURY AT C5-C7 LEVEL WITHOUT INJURY OF SPINAL BONE, SUBSEQUENT ENCOUNTER (HCC): ICD-10-CM

## 2017-03-08 DIAGNOSIS — M54.17 RADICULOPATHY OF LUMBOSACRAL REGION: ICD-10-CM

## 2017-03-08 PROCEDURE — 99215 OFFICE O/P EST HI 40 MIN: CPT | Performed by: NEUROLOGICAL SURGERY

## 2017-03-08 NOTE — PROGRESS NOTES
[facility]   Outpatient Neurological Surgery Follow Up    Michael Chery  : 1970  3/8/2017  PCP: Brenna Sepulveda MD  Referring Provider: No ref. provider found    REASON FOR VISIT:Patient presents with:   Other: follow up for numbe plantar and dorsiflexion is full resistance. Incision: Her cervical neck incision is well-healed with no significant erythema, drainage, discharge or edema.     DIAGNOSTIC DATA:    IMAGING:  In reviewing the patient's MRI of the lumbar spine she has a grad root on the right. Out of an abundance of caution we will send the patient to Dr. Alexia Cain at [facility] for an overall second opinion.   The patient has requested this and I am in full agreement as having a senior spine specialist neurosurgeon e

## 2017-03-08 NOTE — PROGRESS NOTES
Pt is here to follow up for \"numbness and tingling in both hands and arms, pt states symptoms have worsened.    Right leg weakness progressively worsening as day passes\"

## 2017-03-10 ENCOUNTER — TELEPHONE (OUTPATIENT)
Dept: SURGERY | Facility: CLINIC | Age: 47
End: 2017-03-10

## 2017-03-13 NOTE — TELEPHONE ENCOUNTER
LMTCB, Inform patient that APN with Dr. Navid Palacios acknowledged that she has received medical records and will be contacting patient  Referral is for Dr. Kaylee Altamirano, Spine specialist

## 2017-03-13 NOTE — TELEPHONE ENCOUNTER
Informed patient that [facility], Odie Holstein AICHA will be contacting her for appointment. Asking if EMG study scheduled next week will be sent, informed her to let nurses know at that time.

## 2017-03-14 ENCOUNTER — OFFICE VISIT (OUTPATIENT)
Dept: PHYSICAL THERAPY | Age: 47
End: 2017-03-14
Attending: NURSE PRACTITIONER
Payer: COMMERCIAL

## 2017-03-14 PROCEDURE — 97140 MANUAL THERAPY 1/> REGIONS: CPT | Performed by: PHYSICAL THERAPIST

## 2017-03-14 PROCEDURE — 97112 NEUROMUSCULAR REEDUCATION: CPT | Performed by: PHYSICAL THERAPIST

## 2017-03-14 PROCEDURE — 97110 THERAPEUTIC EXERCISES: CPT | Performed by: PHYSICAL THERAPIST

## 2017-03-14 NOTE — PROGRESS NOTES
Referring Physician: Dr. Savana Greene     Dx: S/P cervical spinal fusion (Z98.1), Cervical myelopathy (G95.9), DDD (degenerative disc disease), cervical (M50.30)           Authorized # of Visits:  12         Next MD visit: none scheduled  Fall Ri Date: 1/11/2017  Tx#: 2/16 Date: 2/22/2017  Tx#: 8/ Date: 2/27/2017  Tx#: 9/  Re-eval Date: 3/1/2017  Tx#: 10 Date: 3/6/2017  Tx#: 11 Date: 3/14/2017  Tx#: 11   TRX rows x 20 TRX rows SL x 20, TRX SL to squat x 10 (B) Walking lunges & trunk rot with ym

## 2017-03-15 ENCOUNTER — APPOINTMENT (OUTPATIENT)
Dept: PHYSICAL THERAPY | Age: 47
End: 2017-03-15
Attending: NURSE PRACTITIONER
Payer: COMMERCIAL

## 2017-03-20 ENCOUNTER — OFFICE VISIT (OUTPATIENT)
Dept: PHYSICAL THERAPY | Age: 47
End: 2017-03-20
Attending: NURSE PRACTITIONER
Payer: COMMERCIAL

## 2017-03-20 PROCEDURE — 97112 NEUROMUSCULAR REEDUCATION: CPT | Performed by: PHYSICAL THERAPIST

## 2017-03-20 PROCEDURE — 97110 THERAPEUTIC EXERCISES: CPT | Performed by: PHYSICAL THERAPIST

## 2017-03-20 PROCEDURE — 97140 MANUAL THERAPY 1/> REGIONS: CPT | Performed by: PHYSICAL THERAPIST

## 2017-03-20 NOTE — PROGRESS NOTES
Referring Physician: Dr. Wilmer Antunez     Dx: S/P cervical spinal fusion (Z98.1), Cervical myelopathy (G95.9), DDD (degenerative disc disease), cervical (M50.30)           Authorized # of Visits:  12         Next MD visit: none scheduled  Fall Ri stretch x 5 FR: I, T x 15, 2#, walking lunges OH lift 3# x 1L FR: I, T x 15, 2#, walking lunges OH lift 3# x 1L x -  Walking lunges x 2L with OH lift 3# X with 4#    Wall - W x 20 BOSU FSU x 10 (B) 5\" sec hold x x Upside bosu squats x 10  X X 15

## 2017-03-21 ENCOUNTER — OFFICE VISIT (OUTPATIENT)
Dept: ELECTROPHYSIOLOGY | Facility: HOSPITAL | Age: 47
End: 2017-03-21
Attending: NEUROLOGICAL SURGERY
Payer: COMMERCIAL

## 2017-03-21 DIAGNOSIS — M54.16 LUMBAR RADICULOPATHY: ICD-10-CM

## 2017-03-21 PROCEDURE — 95910 NRV CNDJ TEST 7-8 STUDIES: CPT

## 2017-03-21 PROCEDURE — 95886 MUSC TEST DONE W/N TEST COMP: CPT

## 2017-03-24 NOTE — TELEPHONE ENCOUNTER
Pt calling due to not being contacted yet by [facility], states she contacted them and they have no information about her. She is very upset and wants update by end of day with an update.   Informed her some one would reach out to [facility] and call her back

## 2017-03-24 NOTE — TELEPHONE ENCOUNTER
Informed patient that I spoke with Davy HOLCOMB with Dr. Alexia Cain  Per Macie Oconnell, they have been trying to contact patient without success  Confirmed cell # with Macie Oconnell and she did have correct celll #  Patient states that no one has contacted her from [facility].

## 2017-04-03 ENCOUNTER — TELEPHONE (OUTPATIENT)
Dept: SURGERY | Facility: CLINIC | Age: 47
End: 2017-04-03

## 2017-04-03 ENCOUNTER — OFFICE VISIT (OUTPATIENT)
Dept: PHYSICAL THERAPY | Age: 47
End: 2017-04-03
Attending: INTERNAL MEDICINE
Payer: COMMERCIAL

## 2017-04-03 PROCEDURE — 97112 NEUROMUSCULAR REEDUCATION: CPT | Performed by: PHYSICAL THERAPIST

## 2017-04-03 PROCEDURE — 97110 THERAPEUTIC EXERCISES: CPT | Performed by: PHYSICAL THERAPIST

## 2017-04-03 PROCEDURE — 97140 MANUAL THERAPY 1/> REGIONS: CPT | Performed by: PHYSICAL THERAPIST

## 2017-04-03 NOTE — TELEPHONE ENCOUNTER
Pt calling for EMG results, informed her per Report results are \"Normal\" will discuss this with her further at her upcoming appointment.   Pt requested results be faxed to Dr. Lizett Navarro office and requested # to file room in order for her to request CD with

## 2017-04-03 NOTE — PROGRESS NOTES
Referring Physician: Dr. Leni Fleming      Discharge Summary    Pt has attended 14, cancelled 1, and no shown 0 visits in Physical Therapy.      Dx: S/P cervical spinal fusion (Z98.1), Cervical myelopathy (G95.9), DDD (degenerative disc disease), improve cervical AROM flexion by 45 degrees to improve tolerance for looking down to tie shoes (10visits) Met  · Pt will improve cervical AROM rotation to >60 degrees to improve tolerance for ADL such as turning head to check blind spot while driving (12 v Elliptical x 6' f/b X 7' X 8' X 7' Elliptical x 8', L-1 L-2 x   STM to cervical & thoracic ps x 15' STM to cervical ps x 8' X 12' x x X and brief scar tissue mobilization 10' x x    isometric in all direction for cervical DNF x 8, 10 sec  X 10, 10 sec x X

## 2017-04-05 ENCOUNTER — TELEPHONE (OUTPATIENT)
Dept: NEUROLOGY | Facility: CLINIC | Age: 47
End: 2017-04-05

## 2017-04-05 NOTE — TELEPHONE ENCOUNTER
Pt is scheduled , MRI Cervical ordered by Dr. Ulises Bains has been approved by your insurance, authorization # [id number] and is approved through [id number]. Please have procedure completed before 5.3.17.  Schedule at your convenience with the central schedulin

## 2017-04-07 ENCOUNTER — TELEPHONE (OUTPATIENT)
Dept: SURGERY | Facility: CLINIC | Age: 47
End: 2017-04-07

## 2017-04-17 ENCOUNTER — HOSPITAL ENCOUNTER (OUTPATIENT)
Dept: MRI IMAGING | Age: 47
Discharge: HOME | End: 2017-04-17
Attending: NEUROLOGICAL SURGERY
Payer: COMMERCIAL

## 2017-04-17 DIAGNOSIS — G95.9 CERVICAL MYELOPATHY (HCC): ICD-10-CM

## 2017-04-17 PROCEDURE — A9575 INJ GADOTERATE MEGLUMI 0.1ML: HCPCS | Performed by: NEUROLOGICAL SURGERY

## 2017-04-17 PROCEDURE — 72156 MRI NECK SPINE W/O & W/DYE: CPT

## 2017-04-19 ENCOUNTER — OFFICE VISIT (OUTPATIENT)
Dept: SURGERY | Facility: CLINIC | Age: 47
End: 2017-04-19

## 2017-04-19 VITALS
WEIGHT: 175 LBS | RESPIRATION RATE: 16 BRPM | HEART RATE: 84 BPM | SYSTOLIC BLOOD PRESSURE: 100 MMHG | BODY MASS INDEX: 28.13 KG/M2 | HEIGHT: 66 IN | DIASTOLIC BLOOD PRESSURE: 70 MMHG

## 2017-04-19 DIAGNOSIS — Z98.890 POST-OPERATIVE STATE: Primary | ICD-10-CM

## 2017-04-19 DIAGNOSIS — S14.105D SPINAL CORD INJURY AT C5-C7 LEVEL WITHOUT INJURY OF SPINAL BONE, SUBSEQUENT ENCOUNTER (HCC): ICD-10-CM

## 2017-04-19 PROCEDURE — 99214 OFFICE O/P EST MOD 30 MIN: CPT | Performed by: NEUROLOGICAL SURGERY

## 2017-04-19 RX ORDER — PHENTERMINE AND TOPIRAMATE 15; 92 MG/1; MG/1
1 CAPSULE, EXTENDED RELEASE ORAL DAILY
Refills: 4 | COMMUNITY
Start: 2017-04-03 | End: 2017-09-10

## 2017-04-19 NOTE — PROGRESS NOTES
Pt is here to follow up after imaging and testing  Pt states she does have some \"soreness\" in the cervical area

## 2017-04-19 NOTE — PATIENT INSTRUCTIONS
Refill policies:    • Allow 2 business days for refills; controlled substances may take longer.   • Contact your pharmacy at least 5 days prior to running out of medication and have them send an electronic request or submit request through the “request re insurance carrier to obtain pre-certification or prior authorization. Unfortunately, [facility] has seen an increase in denial of payment even though the procedure/test has been pre-certified.   You are strongly encouraged to contact your insurance carrier to v

## 2017-04-24 NOTE — PROGRESS NOTES
[facility]   Outpatient Neurological Surgery Follow Up    Rick Gonsales  : 1970  PCP: Stevan Herr MD  Referring Provider: No ref.  provider found    REASON FOR VISIT:Patient presents with:  Test Results: follow up a /70 mmHg  Pulse 84  Resp 16  Ht 66\"  Wt 175 lb  BMI 28.26 kg/m2  GENERAL: Alert and oriented person place time event. No apparent distress. HEENT: Head is normocephalic atraumatic. Face is symmetric.   HEART: Regular rate and rhythm  LUNGS: Nonlab

## 2017-06-09 ENCOUNTER — TELEPHONE (OUTPATIENT)
Dept: FAMILY MEDICINE CLINIC | Facility: CLINIC | Age: 47
End: 2017-06-09

## 2017-06-09 ENCOUNTER — OFFICE VISIT (OUTPATIENT)
Dept: FAMILY MEDICINE CLINIC | Facility: CLINIC | Age: 47
End: 2017-06-09

## 2017-06-09 VITALS
RESPIRATION RATE: 16 BRPM | OXYGEN SATURATION: 99 % | DIASTOLIC BLOOD PRESSURE: 74 MMHG | HEART RATE: 109 BPM | SYSTOLIC BLOOD PRESSURE: 116 MMHG | TEMPERATURE: 98 F

## 2017-06-09 DIAGNOSIS — H65.92 MIDDLE EAR EFFUSION, LEFT: ICD-10-CM

## 2017-06-09 DIAGNOSIS — J01.00 ACUTE MAXILLARY SINUSITIS, RECURRENCE NOT SPECIFIED: Primary | ICD-10-CM

## 2017-06-09 PROBLEM — R07.9 ACUTE CHEST PAIN: Status: RESOLVED | Noted: 2017-02-16 | Resolved: 2017-06-09

## 2017-06-09 PROCEDURE — 99213 OFFICE O/P EST LOW 20 MIN: CPT | Performed by: NURSE PRACTITIONER

## 2017-06-09 RX ORDER — FLUTICASONE PROPIONATE 50 MCG
2 SPRAY, SUSPENSION (ML) NASAL DAILY
Qty: 1 BOTTLE | Refills: 0 | Status: SHIPPED | OUTPATIENT
Start: 2017-06-09 | End: 2017-11-06

## 2017-06-09 RX ORDER — AMOXICILLIN AND CLAVULANATE POTASSIUM 875; 125 MG/1; MG/1
1 TABLET, FILM COATED ORAL 2 TIMES DAILY
Qty: 20 TABLET | Refills: 0 | Status: SHIPPED | OUTPATIENT
Start: 2017-06-09 | End: 2017-06-09

## 2017-06-09 RX ORDER — AMOXICILLIN AND CLAVULANATE POTASSIUM 875; 125 MG/1; MG/1
1 TABLET, FILM COATED ORAL 2 TIMES DAILY
Qty: 20 TABLET | Refills: 0 | Status: SHIPPED | OUTPATIENT
Start: 2017-06-09 | End: 2017-06-19

## 2017-06-09 RX ORDER — FLUTICASONE PROPIONATE 50 MCG
2 SPRAY, SUSPENSION (ML) NASAL DAILY
Qty: 1 BOTTLE | Refills: 0 | Status: SHIPPED | OUTPATIENT
Start: 2017-06-09 | End: 2017-06-09

## 2017-06-09 NOTE — TELEPHONE ENCOUNTER
Prescriptions from [facility] visit erxed to [facility] as requested. Error in transmission x2 attempts.   Called [facility] and spoke  With pharmacist whom took verbal order for Augmentin and Flonase

## 2017-06-09 NOTE — PROGRESS NOTES
CHIEF COMPLAINT:   Patient presents with:  Ear Pain      HPI:   Kristie Wilson is a 56year old female who presents for upper respiratory symptoms for  1 weeks. Patient reports sore throat, congestion, dry cough, ear pain.  Symptoms have been worsening 10.2016 mri C5-6 level concerning for cord ischemia/chronic infarction.  There is edema intrinsic to the cord extending from the C4-5 to C7 level with mild cord expansion.   There is central canal stenosis and congenitally narrowed canal most severe at th LYMPH: No lymphadenopathy.         ASSESSMENT AND PLAN:   Maximo Fernandez is a 56year old female who presents with     ASSESSMENT: Acute maxillary sinusitis, recurrence not specified  (primary encounter diagnosis)  Middle ear effusion, left    PLAN: Meds as · An expectorant containing guaifenesin may help thin the mucus and promote drainage from the sinuses. · Over-the-counter decongestants may be used unless a similar medicine was prescribed.  Nasal sprays work the fastest. Use one that contains phenylephrin © 7054-5969 The [address] [place] [address], [address]. All rights reserved. This information is not intended as a substitute for professional medical care. Always follow your healthcare professional's instructions.             The

## 2017-08-22 ENCOUNTER — HOSPITAL ENCOUNTER (OUTPATIENT)
Dept: GENERAL RADIOLOGY | Age: 47
Discharge: HOME | End: 2017-08-22
Attending: NEUROLOGICAL SURGERY
Payer: COMMERCIAL

## 2017-08-22 ENCOUNTER — TELEPHONE (OUTPATIENT)
Dept: SURGERY | Facility: CLINIC | Age: 47
End: 2017-08-22

## 2017-08-22 DIAGNOSIS — Z98.890 POST-OPERATIVE STATE: ICD-10-CM

## 2017-08-22 DIAGNOSIS — S14.105D SPINAL CORD INJURY AT C5-C7 LEVEL WITHOUT INJURY OF SPINAL BONE, SUBSEQUENT ENCOUNTER (HCC): ICD-10-CM

## 2017-08-22 DIAGNOSIS — Z98.1 S/P CERVICAL SPINAL FUSION: ICD-10-CM

## 2017-08-22 DIAGNOSIS — M48.02 CERVICAL STENOSIS OF SPINAL CANAL: Primary | ICD-10-CM
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
## 2017-08-22 PROCEDURE — 72040 X-RAY EXAM NECK SPINE 2-3 VW: CPT | Performed by: NEUROLOGICAL SURGERY

## 2017-08-22 NOTE — TELEPHONE ENCOUNTER
Spoke with patient who will be getting the xray tonight and already scheduled f/u appointment for tomorrow. No further questions at this time.

## 2017-08-22 NOTE — TELEPHONE ENCOUNTER
Spoke with  who stated patient tried to contact him regarding a call she had yesterday.   left a message for patient to complete cervical AP and LAT xray and that she can make an appointment to come into the office tomorrow after xray is com

## 2017-08-23 ENCOUNTER — OFFICE VISIT (OUTPATIENT)
Dept: SURGERY | Facility: CLINIC | Age: 47
End: 2017-08-23

## 2017-08-23 VITALS
HEART RATE: 80 BPM | DIASTOLIC BLOOD PRESSURE: 72 MMHG | WEIGHT: 172 LBS | BODY MASS INDEX: 27.64 KG/M2 | HEIGHT: 66 IN | SYSTOLIC BLOOD PRESSURE: 122 MMHG

## 2017-08-23 DIAGNOSIS — S14.105D SPINAL CORD INJURY AT C5-C7 LEVEL WITHOUT INJURY OF SPINAL BONE, SUBSEQUENT ENCOUNTER (HCC): ICD-10-CM

## 2017-08-23 DIAGNOSIS — M54.2 NECK AND SHOULDER PAIN: Primary | ICD-10-CM

## 2017-08-23 DIAGNOSIS — M25.519 NECK AND SHOULDER PAIN: Primary | ICD-10-CM

## 2017-08-23 DIAGNOSIS — Z98.890 POST-OPERATIVE STATE: ICD-10-CM

## 2017-08-23 PROCEDURE — 99214 OFFICE O/P EST MOD 30 MIN: CPT | Performed by: NEUROLOGICAL SURGERY

## 2017-08-23 RX ORDER — ORPHENADRINE CITRATE 100 MG/1
100 TABLET, EXTENDED RELEASE ORAL EVERY 12 HOURS PRN
Qty: 60 TABLET | Refills: 0 | Status: SHIPPED | OUTPATIENT
Start: 2017-08-23 | End: 2017-09-18

## 2017-08-23 NOTE — PROGRESS NOTES
Neurosurgery Cervical  Follow up      REASON FOR VISIT: Neck pain after a fall    HISTORY OF PRESENT Rick Lind is a 56year old female s/p anterior cervical discectomy and fusion cervical 4-7, partial vertebrectomy c5-7 (10/20/16) with Dr. Haily Castillo HISTORY: (As transcribed by Technologist)  Patient had surgery to C4-C7 October 2016. Earlier this morning patient went to sit back on chair and hit the floor causing head to fall back and heard a \"snap\" to back of head.            FINDINGS:    Stable ap and event and has full resistance on examination on my examination. She does have bilateral Wolfe's on examination. Sensory is intact light touch throughout. Because of her acute pain from the fall we will give her a muscle relaxer.   We will see the p

## 2017-08-23 NOTE — PROGRESS NOTES
Pt. States she was doing really well up until yesterday she fell and her head snapped back, heard a crack in her neck, right arm was numb and tingling through out the night while she laid flat. Numb and tingling this morning but has gotten better.  Occasion

## 2017-09-11 RX ORDER — PHENTERMINE AND TOPIRAMATE 15; 92 MG/1; MG/1
CAPSULE, EXTENDED RELEASE ORAL
Qty: 30 CAPSULE | Refills: 6 | Status: SHIPPED
Start: 2017-09-11 | End: 2018-04-27

## 2017-09-11 NOTE — TELEPHONE ENCOUNTER
Per your OV notes 3/4/17  Obesity- cpm weight loss meds  Ok to refill below  rtc in 6mo    Patient was encouraged to schedule a f/u based on 6 mo f/u recommendation as well as [facility] policy regarding controlled meds but is insisting on refill without appt for

## 2017-09-11 NOTE — TELEPHONE ENCOUNTER
Requesting Qsymia   LOV: 3/4/17  RTC: 6 mo   Last Labs: [facility]   Filled: 3/15/16 #30 with 5 refills    Future Appointments  Date Time Provider Robbie Thomas   11/22/2017 10:00 AM DO [facility]       Patient would be due for f/

## 2017-09-11 NOTE — TELEPHONE ENCOUNTER
Patient stated that when she was seen last by Dr. Dalton Flores he told her specifically that the prescription for Qsymia is good for a year supply. She stated that she has had this issue before with our office and is upset. She would like to speak with a nurse.  Ple

## 2017-09-12 RX ORDER — PHENTERMINE AND TOPIRAMATE 15; 92 MG/1; MG/1
CAPSULE, EXTENDED RELEASE ORAL
Qty: 30 CAPSULE | OUTPATIENT
Start: 2017-09-12

## 2017-09-12 NOTE — TELEPHONE ENCOUNTER
RX filled for another 6 months from Dr. Ronna Connelly. Script for Qsymia faxed to pharmacy and confirmation received. Left message for patient informing her that RX was sent.

## 2017-09-19 NOTE — TELEPHONE ENCOUNTER
Medication: orphenadrine citrate    Date of last refill: 8/23/2016  [place]    Last office visit: 8/23/2017  Due back to clinic per last office note:   In about 3 months  Date next office visit scheduled:  11/22/17

## 2017-09-20 ENCOUNTER — TELEPHONE (OUTPATIENT)
Dept: SURGERY | Facility: CLINIC | Age: 47
End: 2017-09-20

## 2017-09-20 RX ORDER — ORPHENADRINE CITRATE 100 MG/1
TABLET, EXTENDED RELEASE ORAL
Qty: 60 TABLET | Refills: 0 | Status: SHIPPED | OUTPATIENT
Start: 2017-09-20 | End: 2017-11-06

## 2017-09-20 NOTE — TELEPHONE ENCOUNTER
Patient was called and informed refill was sent. Patient advised to call with questions or concerns.

## 2017-10-23 ENCOUNTER — TELEPHONE (OUTPATIENT)
Dept: SURGERY | Facility: CLINIC | Age: 47
End: 2017-10-23

## 2017-10-30 ENCOUNTER — HOSPITAL ENCOUNTER (OUTPATIENT)
Dept: MRI IMAGING | Age: 47
Discharge: HOME | End: 2017-10-30
Attending: NEUROLOGICAL SURGERY
Payer: COMMERCIAL

## 2017-10-30 DIAGNOSIS — S14.105D SPINAL CORD INJURY AT C5-C7 LEVEL WITHOUT INJURY OF SPINAL BONE, SUBSEQUENT ENCOUNTER (HCC): ICD-10-CM

## 2017-10-30 PROCEDURE — 72156 MRI NECK SPINE W/O & W/DYE: CPT | Performed by: NEUROLOGICAL SURGERY

## 2017-10-30 PROCEDURE — A9575 INJ GADOTERATE MEGLUMI 0.1ML: HCPCS | Performed by: NEUROLOGICAL SURGERY

## 2017-11-06 ENCOUNTER — OFFICE VISIT (OUTPATIENT)
Dept: SURGERY | Facility: CLINIC | Age: 47
End: 2017-11-06

## 2017-11-06 VITALS — SYSTOLIC BLOOD PRESSURE: 110 MMHG | DIASTOLIC BLOOD PRESSURE: 80 MMHG | HEART RATE: 68 BPM

## 2017-11-06 DIAGNOSIS — Z98.890 POST-OPERATIVE STATE: Primary | ICD-10-CM

## 2017-11-06 DIAGNOSIS — G95.9 CERVICAL MYELOPATHY (HCC): ICD-10-CM

## 2017-11-06 PROCEDURE — 99214 OFFICE O/P EST MOD 30 MIN: CPT | Performed by: NEUROLOGICAL SURGERY

## 2017-11-06 RX ORDER — GABAPENTIN 100 MG/1
300 CAPSULE ORAL 3 TIMES DAILY
Qty: 270 CAPSULE | Refills: 1 | Status: SHIPPED | OUTPATIENT
Start: 2017-11-06 | End: 2018-01-15

## 2017-11-06 NOTE — PATIENT INSTRUCTIONS
Refill policies:    • Allow 2-3 business days for refills; controlled substances may take longer.   • Contact your pharmacy at least 5 days prior to running out of medication and have them send an electronic request or submit request through the [facility] have a procedure or additional testing performed. [facility]) will contact your insurance carrier to obtain pre-certification or prior authorization.     Unfortunately, [facility] has seen an increase in denial of payment even though the p

## 2017-11-07 NOTE — PROGRESS NOTES
[facility]   Outpatient Neurological Surgery Follow Up    Aysha Last  : 1970  PCP: Julio Fuchs MD  Referring Provider: No ref. provider found    REASON FOR VISIT:Patient presents with:   Other: 1 yr f/u       SUB signal seen at C5-6 and her cervical spinal cord. The swelling of the cord at that point has nearly resolved. The patient now has improvement in the canal diameter with no significant or severe spinal canal stenosis or neuroforaminal compromise.   The pat

## 2017-11-08 ENCOUNTER — PATIENT MESSAGE (OUTPATIENT)
Dept: SURGERY | Facility: CLINIC | Age: 47
End: 2017-11-08

## 2017-11-08 ENCOUNTER — TELEPHONE (OUTPATIENT)
Dept: SURGERY | Facility: CLINIC | Age: 47
End: 2017-11-08

## 2017-11-08 NOTE — TELEPHONE ENCOUNTER
Patient would like the regular letter restricting \"cannot restrain students, etc.\" Same letter as in April but dated for November

## 2017-11-08 NOTE — TELEPHONE ENCOUNTER
Per LOV 11/6/2017 \"The patient has been released from care at this point. \"    Letter initiated and pended, will rout and discuss with Dr. Clemens Shone regarding if pt should continue on restrictions.

## 2017-11-09 NOTE — TELEPHONE ENCOUNTER
From: Kenia Zarco  To: Rufino He DO  Sent: 11/8/2017 8:17 PM CST  Subject: Visit Follow-up Question    Good evening,    [facility] can the wording be changed to say what was recommended from April?  (Not sure ab

## 2017-11-10 NOTE — TELEPHONE ENCOUNTER
Patient provided letter for work. Dr. Mae Marin agrees with the plan. Patient advised to call with questions or concerns.

## 2018-01-12 RX ORDER — GABAPENTIN 100 MG/1
300 CAPSULE ORAL 3 TIMES DAILY
Qty: 270 CAPSULE | Refills: 1 | Status: CANCELLED
Start: 2018-01-12

## 2018-01-15 RX ORDER — GABAPENTIN 300 MG/1
300 CAPSULE ORAL 3 TIMES DAILY
Qty: 90 CAPSULE | Refills: 0 | Status: SHIPPED | OUTPATIENT
Start: 2018-01-15 | End: 2018-02-19

## 2018-01-15 NOTE — TELEPHONE ENCOUNTER
From: Dede June  Sent: 1/12/2018 10:14 PM CST  Subject: Medication Renewal Request    Giselle Alonzo. Gissell Salgado would like a refill of the following medications:     gabapentin 100 MG Oral Cap [Rashel Virgen Shaker   Patient Comment: Is it possible to switch t

## 2018-01-15 NOTE — TELEPHONE ENCOUNTER
Medication: Gabapentin    Date of last refill: 11/6/17  [place]    Last office visit: 11/6/17  Due back to clinic per last office note:  As needed  Date next office visit scheduled:  None scheduled    Last OV note re

## 2018-01-16 NOTE — TELEPHONE ENCOUNTER
Patient refill request for Gabapentin approved. Will have nursing advise the patient to continue Gabapentin refills with PCP, as per Dr. Roger Rahman last note, patient only needed to follow up as needed.  Continued medication refills and monitoring should be do

## 2018-01-16 NOTE — TELEPHONE ENCOUNTER
Informed patient that refill was approved and that future refills should be addressed by PCP office. Patient verbalized understanding.

## 2018-02-06 RX ORDER — DESVENLAFAXINE 50 MG/1
TABLET, EXTENDED RELEASE ORAL
Qty: 90 TABLET | Refills: 3 | OUTPATIENT
Start: 2018-02-06

## 2018-02-19 ENCOUNTER — OFFICE VISIT (OUTPATIENT)
Dept: INTERNAL MEDICINE CLINIC | Facility: CLINIC | Age: 48
End: 2018-02-19

## 2018-02-19 VITALS
SYSTOLIC BLOOD PRESSURE: 104 MMHG | WEIGHT: 179 LBS | RESPIRATION RATE: 12 BRPM | DIASTOLIC BLOOD PRESSURE: 66 MMHG | HEART RATE: 64 BPM | BODY MASS INDEX: 28.77 KG/M2 | HEIGHT: 66 IN | TEMPERATURE: 98 F

## 2018-02-19 DIAGNOSIS — Z12.4 PAP SMEAR FOR CERVICAL CANCER SCREENING: ICD-10-CM

## 2018-02-19 DIAGNOSIS — Z12.31 ENCOUNTER FOR SCREENING MAMMOGRAM FOR MALIGNANT NEOPLASM OF BREAST: ICD-10-CM

## 2018-02-19 DIAGNOSIS — E66.3 OVERWEIGHT (BMI 25.0-29.9): ICD-10-CM

## 2018-02-19 DIAGNOSIS — Z00.00 ROUTINE GENERAL MEDICAL EXAMINATION AT A HEALTH CARE FACILITY: Primary | ICD-10-CM

## 2018-02-19 DIAGNOSIS — Z11.51 SCREENING FOR HPV (HUMAN PAPILLOMAVIRUS): ICD-10-CM

## 2018-02-19 DIAGNOSIS — Z00.00 LABORATORY EXAM ORDERED AS PART OF ROUTINE GENERAL MEDICAL EXAMINATION: ICD-10-CM

## 2018-02-19 DIAGNOSIS — F41.9 ANXIETY: ICD-10-CM

## 2018-02-19 DIAGNOSIS — R63.5 WEIGHT GAIN: ICD-10-CM

## 2018-02-19 PROCEDURE — 99396 PREV VISIT EST AGE 40-64: CPT | Performed by: INTERNAL MEDICINE

## 2018-02-19 PROCEDURE — 87624 HPV HI-RISK TYP POOLED RSLT: CPT | Performed by: INTERNAL MEDICINE

## 2018-02-19 PROCEDURE — 88175 CYTOPATH C/V AUTO FLUID REDO: CPT | Performed by: INTERNAL MEDICINE

## 2018-02-19 RX ORDER — DESVENLAFAXINE 50 MG/1
50 TABLET, EXTENDED RELEASE ORAL DAILY
Qty: 90 TABLET | Refills: 3 | Status: SHIPPED | OUTPATIENT
Start: 2018-02-19 | End: 2018-11-15

## 2018-02-19 RX ORDER — GABAPENTIN 300 MG/1
300 CAPSULE ORAL 2 TIMES DAILY
Qty: 180 CAPSULE | Refills: 3 | Status: SHIPPED | OUTPATIENT
Start: 2018-02-19 | End: 2021-08-16 | Stop reason: ALTCHOICE

## 2018-02-19 NOTE — PROGRESS NOTES
Patient presents with:  Physical: KB RM 4      HPI:    Patient here for cpe with pap  Last pap 2013 WNL, periods regular, no vaginal issues.  Overdue for mammogram  S/p cervical discectomy and fusion, Dr. Dallas Dale, right side discomfort end of day so on neurot Acute pharyngitis    • Acute sinusitis, unspecified    • Anxiety state    • Back problem    • Cervical stenosis of spinal canal 10/21/2016    10.2016 mri C5-6 level concerning for cord ischemia/chronic infarction.  There is edema intrinsic to the cord exten CAPSULE BY MOUTH EVERY DAY Disp: 30 capsule Rfl: 6       Allergies    Sulfa Antibiotics       Hives  Shellfish-Derived P*    Hives    Comment:Stomach problems    Health Maintenance      Immunization History  Administered            Date(s) Administered on gabapentin, CPM  Pap smear for cervical cancer screening  Laboratory exam ordered as part of routine general medical examination  Encounter for screening mammogram for malignant neoplasm of breast  Anxiety - controlled, cpm  Weight gain/Overweight (bmi

## 2018-02-20 LAB — HPV I/H RISK 1 DNA SPEC QL NAA+PROBE: NEGATIVE

## 2018-02-27 ENCOUNTER — HOSPITAL ENCOUNTER (OUTPATIENT)
Dept: MAMMOGRAPHY | Age: 48
Discharge: HOME | End: 2018-02-27
Attending: INTERNAL MEDICINE
Payer: COMMERCIAL

## 2018-02-27 DIAGNOSIS — Z12.31 ENCOUNTER FOR SCREENING MAMMOGRAM FOR MALIGNANT NEOPLASM OF BREAST: ICD-10-CM

## 2018-02-27 PROCEDURE — 77067 SCR MAMMO BI INCL CAD: CPT | Performed by: INTERNAL MEDICINE

## 2018-04-27 PROBLEM — E66.3 OVERWEIGHT (BMI 25.0-29.9): Status: ACTIVE | Noted: 2018-04-27

## 2018-04-27 PROBLEM — R63.5 ABNORMAL WEIGHT GAIN: Status: ACTIVE | Noted: 2018-04-27

## 2018-05-26 PROBLEM — G57.91 NEUROPATHY OF RIGHT LOWER EXTREMITY: Status: ACTIVE | Noted: 2018-05-26

## 2018-09-22 ENCOUNTER — OFFICE VISIT (OUTPATIENT)
Dept: FAMILY MEDICINE CLINIC | Facility: CLINIC | Age: 48
End: 2018-09-22
Payer: COMMERCIAL

## 2018-09-22 VITALS
TEMPERATURE: 98 F | HEART RATE: 98 BPM | HEIGHT: 66 IN | DIASTOLIC BLOOD PRESSURE: 60 MMHG | SYSTOLIC BLOOD PRESSURE: 100 MMHG | OXYGEN SATURATION: 99 % | RESPIRATION RATE: 18 BRPM | WEIGHT: 180 LBS | BODY MASS INDEX: 28.93 KG/M2

## 2018-09-22 DIAGNOSIS — R05.9 COUGH IN ADULT: ICD-10-CM

## 2018-09-22 DIAGNOSIS — R09.82 PND (POST-NASAL DRIP): ICD-10-CM

## 2018-09-22 DIAGNOSIS — J01.90 ACUTE SINUSITIS WITH SYMPTOMS > 10 DAYS: Primary | ICD-10-CM

## 2018-09-22 PROCEDURE — 99213 OFFICE O/P EST LOW 20 MIN: CPT | Performed by: NURSE PRACTITIONER

## 2018-09-22 RX ORDER — AMOXICILLIN AND CLAVULANATE POTASSIUM 875; 125 MG/1; MG/1
1 TABLET, FILM COATED ORAL 2 TIMES DAILY
Qty: 20 TABLET | Refills: 0 | Status: SHIPPED | OUTPATIENT
Start: 2018-09-22 | End: 2018-10-02

## 2018-09-22 RX ORDER — BENZONATATE 200 MG/1
200 CAPSULE ORAL 3 TIMES DAILY PRN
Qty: 20 CAPSULE | Refills: 0 | Status: SHIPPED | OUTPATIENT
Start: 2018-09-22 | End: 2018-09-29

## 2018-09-22 RX ORDER — FLUTICASONE PROPIONATE 50 MCG
2 SPRAY, SUSPENSION (ML) NASAL DAILY
Qty: 1 BOTTLE | Refills: 3 | Status: SHIPPED | OUTPATIENT
Start: 2018-09-22 | End: 2018-11-01 | Stop reason: ALTCHOICE

## 2018-09-22 NOTE — PROGRESS NOTES
Bonny Dakins is a 53year old female. Patient presents with:  Cough: prod cough, congestion , running  nose , fatigue x 2 wks . Tried itc sudafed , benadryl help some .     HPI:    Symptoms started  14 days ago with purulent nasal discharge, maxillary with AP diameter thecal                sac narrowed to 4.7 mm as above.   Sp cervical fusion   No date: Depressive disorder, not elsewhere classified  3/4/2017: DJD (degenerative joint disease), lumbar  10/19/2016: Internal hemorrhoid, bleeding  No date: Mi • Fluticasone Propionate 50 MCG/ACT Nasal Suspension 1 Bottle 3     Si sprays by Nasal route daily. • Amoxicillin-Pot Clavulanate 875-125 MG Oral Tab 20 tablet 0     Sig: Take 1 tablet by mouth 2 (two) times daily for 10 days.    • benzonatate 200 M

## 2018-11-01 ENCOUNTER — TELEPHONE (OUTPATIENT)
Dept: SURGERY | Facility: CLINIC | Age: 48
End: 2018-11-01

## 2018-11-01 ENCOUNTER — OFFICE VISIT (OUTPATIENT)
Dept: SURGERY | Facility: CLINIC | Age: 48
End: 2018-11-01
Payer: COMMERCIAL

## 2018-11-01 VITALS — SYSTOLIC BLOOD PRESSURE: 110 MMHG | DIASTOLIC BLOOD PRESSURE: 78 MMHG | HEART RATE: 96 BPM

## 2018-11-01 DIAGNOSIS — M54.6 ACUTE BILATERAL THORACIC BACK PAIN: ICD-10-CM

## 2018-11-01 DIAGNOSIS — R26.81 GAIT INSTABILITY: ICD-10-CM

## 2018-11-01 DIAGNOSIS — Z98.1 S/P CERVICAL SPINAL FUSION: ICD-10-CM

## 2018-11-01 DIAGNOSIS — G95.9 CERVICAL MYELOPATHY (HCC): Primary | ICD-10-CM

## 2018-11-01 DIAGNOSIS — M54.17 RADICULOPATHY OF LUMBOSACRAL REGION: ICD-10-CM

## 2018-11-01 PROCEDURE — 99215 OFFICE O/P EST HI 40 MIN: CPT | Performed by: PHYSICIAN ASSISTANT

## 2018-11-01 RX ORDER — CYCLOBENZAPRINE HCL 10 MG
10 TABLET ORAL 3 TIMES DAILY PRN
Qty: 60 TABLET | Refills: 1 | Status: SHIPPED | OUTPATIENT
Start: 2018-11-01

## 2018-11-01 RX ORDER — MELOXICAM 15 MG/1
15 TABLET ORAL DAILY
Qty: 90 TABLET | Refills: 1 | Status: SHIPPED | OUTPATIENT
Start: 2018-11-01

## 2018-11-01 NOTE — TELEPHONE ENCOUNTER
Pt has not yet scheduled imaging.      Have patient call us back after she has appointment dates for MRIs    Unable to offer dates or providers without this information

## 2018-11-01 NOTE — PROGRESS NOTES
Neurosurgery Clinic Visit  2018    Josephine Bahena PCP:  Lucie Vallejo MD    1970 MRN [id number]     HISTORY OF PRESENT ILLNESS:  Josephine Bahena is a(n) 53year old female who is a former patient of Dr. Braulio Ruff.   She was last seen in 2017 for he walk.Able to heel to toe walk. Spine:  Neck/back pain on flexion and extension. Nontender to palpation over cervical/lumbar area without spasms. Negative Spurling's. Positive L'Hermitte's. Negative SLR.   Negative José Miguel's test.      REVIEW OF STUDIES

## 2018-11-01 NOTE — PROGRESS NOTES
Pt is here for follow up for back pain. Pt was last seen in our office. 11/2017 Pt states that she is having bilateral side pain. Pt states that she has thorasic and lower back. Pt states that she has dragging in the right leg.  Pt states that she has numbne

## 2018-11-01 NOTE — PATIENT INSTRUCTIONS
Refill policies:    • Allow 2-3 business days for refills; controlled substances may take longer.   • Contact your pharmacy at least 5 days prior to running out of medication and have them send an electronic request or submit request through the “request re entire amount billed. Precertification and Prior Authorizations: If your physician has recommended that you have a procedure or additional testing performed.   [facility]) will contact your insurance carrier to obtain pre-certi

## 2018-11-01 NOTE — TELEPHONE ENCOUNTER
pt was told to f/u in 2 wks for an appt to go over imaging, She is not accepting the appt mundo/Lamberto 11/30. Said it's too long of a wait. I offered Tammy Shields in 2wks but she is out of town for thanksgiving.  She is asking for another Neurosurgeon if Dr Fermín Valladares

## 2018-11-02 NOTE — TELEPHONE ENCOUNTER
Pt called back states her MRI is scheduled 11/8/18 would like to schedule f/u appt with Provider. What dates are available?

## 2018-11-02 NOTE — TELEPHONE ENCOUNTER
Pt is scheduled for imaging on 11/8/2018    Pt is to be fit in sooner than next available, provider will need to give time and date

## 2018-11-02 NOTE — TELEPHONE ENCOUNTER
Pt called back, she has all imaging scheduled with [facility] for next Thursday. Pt is upset that she has to wait so long to get into [facility] for imaging. Also, her ins company has yet to authorize the MRIs. Pt will be calling outside imaging facilities today.  She wi

## 2018-11-08 ENCOUNTER — HOSPITAL ENCOUNTER (OUTPATIENT)
Dept: MRI IMAGING | Age: 48
Discharge: HOME | End: 2018-11-08
Attending: PHYSICIAN ASSISTANT
Payer: COMMERCIAL

## 2018-11-08 ENCOUNTER — HOSPITAL ENCOUNTER (OUTPATIENT)
Dept: GENERAL RADIOLOGY | Age: 48
Discharge: HOME | End: 2018-11-08
Attending: PHYSICIAN ASSISTANT
Payer: COMMERCIAL

## 2018-11-08 DIAGNOSIS — M54.17 RADICULOPATHY OF LUMBOSACRAL REGION: ICD-10-CM

## 2018-11-08 DIAGNOSIS — Z98.1 S/P CERVICAL SPINAL FUSION: ICD-10-CM

## 2018-11-08 DIAGNOSIS — G95.9 CERVICAL MYELOPATHY (HCC): ICD-10-CM

## 2018-11-08 DIAGNOSIS — M54.6 ACUTE BILATERAL THORACIC BACK PAIN: ICD-10-CM

## 2018-11-08 DIAGNOSIS — R26.81 GAIT INSTABILITY: ICD-10-CM

## 2018-11-08 PROCEDURE — 72052 X-RAY EXAM NECK SPINE 6/>VWS: CPT | Performed by: PHYSICIAN ASSISTANT

## 2018-11-08 PROCEDURE — 72114 X-RAY EXAM L-S SPINE BENDING: CPT | Performed by: PHYSICIAN ASSISTANT

## 2018-11-08 PROCEDURE — A9575 INJ GADOTERATE MEGLUMI 0.1ML: HCPCS | Performed by: PHYSICIAN ASSISTANT

## 2018-11-08 PROCEDURE — 72146 MRI CHEST SPINE W/O DYE: CPT | Performed by: PHYSICIAN ASSISTANT

## 2018-11-08 PROCEDURE — 72156 MRI NECK SPINE W/O & W/DYE: CPT | Performed by: PHYSICIAN ASSISTANT

## 2018-11-08 PROCEDURE — 72148 MRI LUMBAR SPINE W/O DYE: CPT | Performed by: PHYSICIAN ASSISTANT

## 2018-11-17 ENCOUNTER — LAB ENCOUNTER (OUTPATIENT)
Dept: LAB | Age: 48
End: 2018-11-17
Attending: INTERNAL MEDICINE
Payer: COMMERCIAL

## 2018-11-17 DIAGNOSIS — E66.3 OVERWEIGHT (BMI 25.0-29.9): ICD-10-CM

## 2018-11-17 DIAGNOSIS — Z00.00 LABORATORY EXAM ORDERED AS PART OF ROUTINE GENERAL MEDICAL EXAMINATION: ICD-10-CM

## 2018-11-17 DIAGNOSIS — R63.5 ABNORMAL WEIGHT GAIN: ICD-10-CM

## 2018-11-17 PROCEDURE — 80053 COMPREHEN METABOLIC PANEL: CPT

## 2018-11-17 PROCEDURE — 84443 ASSAY THYROID STIM HORMONE: CPT

## 2018-11-17 PROCEDURE — 85025 COMPLETE CBC W/AUTO DIFF WBC: CPT

## 2018-11-17 PROCEDURE — 83525 ASSAY OF INSULIN: CPT

## 2018-11-17 PROCEDURE — 80061 LIPID PANEL: CPT

## 2018-11-17 PROCEDURE — 36415 COLL VENOUS BLD VENIPUNCTURE: CPT

## 2018-11-20 ENCOUNTER — OFFICE VISIT (OUTPATIENT)
Dept: SURGERY | Facility: CLINIC | Age: 48
End: 2018-11-20
Payer: COMMERCIAL

## 2018-11-20 VITALS — DIASTOLIC BLOOD PRESSURE: 74 MMHG | HEART RATE: 78 BPM | SYSTOLIC BLOOD PRESSURE: 116 MMHG

## 2018-11-20 DIAGNOSIS — M54.16 LUMBAR RADICULOPATHY: Primary | ICD-10-CM

## 2018-11-20 PROCEDURE — 99213 OFFICE O/P EST LOW 20 MIN: CPT | Performed by: NEUROLOGICAL SURGERY

## 2018-11-20 NOTE — PROGRESS NOTES
Pt is here for follow up to review imaging.   Pt states that she is still the same since LOV     Scale pain: 3/10

## 2018-11-20 NOTE — PROGRESS NOTES
Neurosurgery Clinic Visit  2018    Yusef Moore PCP:  Naz Mehta MD    1970 MRN [id number]     HISTORY OF PRESENT ILLNESS:  Yusef Moore is a(n) 53year old female here for follow-up  Her main issues when she walks she sometimes starts t Araseli  11/20/2018  8:52 AM     Dictated but not proofread

## 2018-12-28 RX ORDER — GABAPENTIN 300 MG/1
CAPSULE ORAL
Qty: 180 CAPSULE | Refills: 0 | OUTPATIENT
Start: 2018-12-28

## 2019-01-24 ENCOUNTER — TELEPHONE (OUTPATIENT)
Dept: SURGERY | Facility: CLINIC | Age: 49
End: 2019-01-24

## 2019-02-08 RX ORDER — DESVENLAFAXINE 50 MG/1
TABLET, EXTENDED RELEASE ORAL
Qty: 90 TABLET | Refills: 0 | OUTPATIENT
Start: 2019-02-08

## 2019-05-16 NOTE — PROGRESS NOTES
Referring Physician: Dr. Jessenia Treviño     Progress Summary    Pt has attended 9, cancelled 0, and no shown 0 visits in Physical Therapy.       Dx: S/P cervical spinal fusion (Z98.1), Cervical myelopathy (G95.9), DDD (degenerative disc disease), ce visits) Progressing   · Pt will demonstrate improved deep neck flexor strength to facilitate decreased symptoms of fatigue and tiredness at the end of the day.  (12 visits) Met on some days and progressing  · Pt will improve postural strength (mid/low trap) Wall snow julieta stretch x 5 FR: I, T x 15, 2#, walking lunges OH lift 3# x 1L FR: I, T x 15, 2#, walking lunges OH lift 3# x 1L     Wall - W x 20 BOSU FSU x 10 (B) 5\" sec hold x      shd ext x 20 yellow tubing  Bosu tandem Weight shifting x 1 min each - Declined

## 2019-07-31 PROBLEM — G62.9 NEUROPATHY: Status: ACTIVE | Noted: 2019-07-31

## 2020-11-05 NOTE — MR AVS SNAPSHOT
[facility]  [address] [place] [address]  [phone]               Thank you for choosing us for your health care visit with Jasvir Alexandra PT.   We are glad to serve you and happy to provide you with this summar be advised that they will not be able to accompany the child in the testing room. Parents will remain in the MRI waiting area and be reunited with the child upon completion of the MRI.             Apr 19, 2017  3:30 PM   Follow up with Britany Hua DO <<----- Click to add NO significant Past Surgical History

## 2020-12-07 PROBLEM — E88.81 INSULIN RESISTANCE: Status: ACTIVE | Noted: 2020-12-07

## 2020-12-07 PROBLEM — E88.819 INSULIN RESISTANCE: Status: ACTIVE | Noted: 2020-12-07

## 2021-01-30 ENCOUNTER — IMMUNIZATION (OUTPATIENT)
Dept: LAB | Facility: HOSPITAL | Age: 51
End: 2021-01-30
Attending: EMERGENCY MEDICINE
Payer: COMMERCIAL

## 2021-01-30 DIAGNOSIS — Z23 NEED FOR VACCINATION: Primary | ICD-10-CM

## 2021-01-30 PROCEDURE — 0011A SARSCOV2 VAC 100MCG/0.5ML IM: CPT

## 2021-02-27 ENCOUNTER — IMMUNIZATION (OUTPATIENT)
Dept: LAB | Facility: HOSPITAL | Age: 51
End: 2021-02-27
Attending: PREVENTIVE MEDICINE
Payer: COMMERCIAL

## 2021-02-27 DIAGNOSIS — Z23 NEED FOR VACCINATION: Primary | ICD-10-CM

## 2021-02-27 PROCEDURE — 0012A SARSCOV2 VAC 100MCG/0.5ML IM: CPT

## 2021-08-23 ENCOUNTER — HOSPITAL ENCOUNTER (OUTPATIENT)
Dept: MRI IMAGING | Age: 51
Discharge: HOME | End: 2021-08-23
Attending: Other
Payer: COMMERCIAL

## 2021-08-23 DIAGNOSIS — G95.9 MYELOPATHY (HCC): ICD-10-CM

## 2021-08-23 PROCEDURE — A9575 INJ GADOTERATE MEGLUMI 0.1ML: HCPCS | Performed by: OTHER

## 2021-08-23 PROCEDURE — 70553 MRI BRAIN STEM W/O & W/DYE: CPT | Performed by: OTHER

## 2021-08-23 PROCEDURE — 72156 MRI NECK SPINE W/O & W/DYE: CPT | Performed by: OTHER

## 2021-09-07 ENCOUNTER — HOSPITAL ENCOUNTER (OUTPATIENT)
Dept: CT IMAGING | Age: 51
Discharge: HOME | End: 2021-09-07
Attending: Other
Payer: COMMERCIAL

## 2021-09-07 DIAGNOSIS — R90.89 ABNORMAL FINDING ON MRI OF BRAIN: ICD-10-CM

## 2021-09-07 LAB — CREAT BLD-MCNC: 0.8 MG/DL

## 2021-09-07 PROCEDURE — 70496 CT ANGIOGRAPHY HEAD: CPT | Performed by: OTHER

## 2021-09-07 PROCEDURE — 70498 CT ANGIOGRAPHY NECK: CPT | Performed by: OTHER

## 2021-09-07 PROCEDURE — 82565 ASSAY OF CREATININE: CPT

## 2021-09-13 ENCOUNTER — HOSPITAL ENCOUNTER (OUTPATIENT)
Dept: CV DIAGNOSTICS | Facility: HOSPITAL | Age: 51
Discharge: HOME | End: 2021-09-13
Attending: Other
Payer: COMMERCIAL

## 2021-09-13 DIAGNOSIS — R90.89 ABNORMAL FINDING ON MRI OF BRAIN: ICD-10-CM

## 2021-09-13 PROCEDURE — 93306 TTE W/DOPPLER COMPLETE: CPT | Performed by: OTHER

## 2021-12-13 ENCOUNTER — HOSPITAL ENCOUNTER (OUTPATIENT)
Dept: MRI IMAGING | Age: 51
Discharge: HOME | End: 2021-12-13
Attending: Other
Payer: COMMERCIAL

## 2021-12-13 DIAGNOSIS — R90.89 ABNORMAL FINDING ON MRI OF BRAIN: ICD-10-CM

## 2021-12-13 DIAGNOSIS — G95.9 MYELOPATHY (HCC): ICD-10-CM

## 2021-12-13 DIAGNOSIS — M79.2 NEURALGIA: ICD-10-CM

## 2021-12-13 PROCEDURE — 70553 MRI BRAIN STEM W/O & W/DYE: CPT | Performed by: OTHER

## 2021-12-13 PROCEDURE — 72156 MRI NECK SPINE W/O & W/DYE: CPT | Performed by: OTHER

## 2021-12-13 PROCEDURE — A9575 INJ GADOTERATE MEGLUMI 0.1ML: HCPCS | Performed by: OTHER

## 2023-01-31 ENCOUNTER — HOSPITAL ENCOUNTER (EMERGENCY)
Facility: HOSPITAL | Age: 53
Discharge: HOME | End: 2023-01-31
Attending: EMERGENCY MEDICINE
Payer: COMMERCIAL

## 2023-01-31 VITALS
DIASTOLIC BLOOD PRESSURE: 78 MMHG | SYSTOLIC BLOOD PRESSURE: 134 MMHG | TEMPERATURE: 98 F | WEIGHT: 183 LBS | BODY MASS INDEX: 30 KG/M2 | RESPIRATION RATE: 18 BRPM | HEART RATE: 86 BPM

## 2023-01-31 DIAGNOSIS — S01.511A LIP LACERATION, INITIAL ENCOUNTER: Primary | ICD-10-CM

## 2023-01-31 PROCEDURE — 99283 EMERGENCY DEPT VISIT LOW MDM: CPT

## 2023-01-31 PROCEDURE — 12011 RPR F/E/E/N/L/M 2.5 CM/<: CPT

## 2023-01-31 PROCEDURE — 90471 IMMUNIZATION ADMIN: CPT

## 2023-01-31 RX ORDER — AMOXICILLIN AND CLAVULANATE POTASSIUM 875; 125 MG/1; MG/1
1 TABLET, FILM COATED ORAL 2 TIMES DAILY
Qty: 14 TABLET | Refills: 0 | Status: SHIPPED | OUTPATIENT
Start: 2023-01-31 | End: 2023-02-07

## 2023-02-01 NOTE — DISCHARGE INSTRUCTIONS
Soft foods at home  Motrin for pain at home  Return to the ER if symptoms worsen or if any other problems arise

## 2023-02-01 NOTE — ED INITIAL ASSESSMENT (HPI)
A/O x 4. Patient upper lip wound from dog accidentally head butting her. Patient denies any blood thinners.   Bleeding controlled in triage

## 2023-02-07 ENCOUNTER — OFFICE VISIT (OUTPATIENT)
Dept: FAMILY MEDICINE CLINIC | Facility: CLINIC | Age: 53
End: 2023-02-07
Payer: COMMERCIAL

## 2023-02-07 DIAGNOSIS — Z48.02 VISIT FOR SUTURE REMOVAL: Primary | ICD-10-CM

## 2023-02-07 PROCEDURE — 99024 POSTOP FOLLOW-UP VISIT: CPT | Performed by: NURSE PRACTITIONER

## 2023-02-07 NOTE — PROGRESS NOTES
PROCEDURE: suture removal.   LOCATION: lip   WOUND EXAM: well healed. No drainage, erythema, warmth,  or signs of infection. Wound margins well  Approximated; no dehiscence  Incision cleansed before and after removal; 2 sutures removed. DRESSING: SELENE  COMPLICATIONS: no complications, minimal   PATIENT STATUS: tolerated very well. Instructed to watch for signs of infection.

## 2023-04-17 ENCOUNTER — TELEPHONE (OUTPATIENT)
Dept: INTERNAL MEDICINE CLINIC | Facility: CLINIC | Age: 53
End: 2023-04-17

## 2023-04-18 ENCOUNTER — PATIENT OUTREACH (OUTPATIENT)
Dept: CASE MANAGEMENT | Age: 53
End: 2023-04-18

## 2023-04-18 NOTE — PROCEDURES
The office order for PCP removal request is Approved and finalized on April 18, 2023.     Thanks,  [facility] [employer]

## 2023-07-31 ENCOUNTER — OFFICE VISIT (OUTPATIENT)
Dept: INTERNAL MEDICINE CLINIC | Facility: CLINIC | Age: 53
End: 2023-07-31
Payer: COMMERCIAL

## 2023-07-31 ENCOUNTER — LAB ENCOUNTER (OUTPATIENT)
Dept: LAB | Age: 53
End: 2023-07-31
Attending: INTERNAL MEDICINE
Payer: COMMERCIAL

## 2023-07-31 VITALS
HEART RATE: 90 BPM | OXYGEN SATURATION: 98 % | SYSTOLIC BLOOD PRESSURE: 126 MMHG | HEIGHT: 66 IN | BODY MASS INDEX: 29.7 KG/M2 | RESPIRATION RATE: 16 BRPM | TEMPERATURE: 97 F | WEIGHT: 184.81 LBS | DIASTOLIC BLOOD PRESSURE: 74 MMHG

## 2023-07-31 DIAGNOSIS — F32.A DEPRESSION, UNSPECIFIED DEPRESSION TYPE: ICD-10-CM

## 2023-07-31 DIAGNOSIS — G47.00 INSOMNIA, UNSPECIFIED TYPE: ICD-10-CM

## 2023-07-31 DIAGNOSIS — Z13.0 SCREENING FOR BLOOD DISEASE: ICD-10-CM

## 2023-07-31 DIAGNOSIS — Z00.00 WELLNESS EXAMINATION: Primary | ICD-10-CM

## 2023-07-31 DIAGNOSIS — Z12.4 SCREENING FOR CERVICAL CANCER: ICD-10-CM

## 2023-07-31 DIAGNOSIS — Z12.11 SCREEN FOR COLON CANCER: ICD-10-CM

## 2023-07-31 DIAGNOSIS — Z12.31 ENCOUNTER FOR SCREENING MAMMOGRAM FOR MALIGNANT NEOPLASM OF BREAST: ICD-10-CM

## 2023-07-31 DIAGNOSIS — Z13.228 SCREENING FOR METABOLIC DISORDER: ICD-10-CM

## 2023-07-31 DIAGNOSIS — F41.9 ANXIETY: ICD-10-CM

## 2023-07-31 DIAGNOSIS — Z13.220 SCREENING, LIPID: ICD-10-CM

## 2023-07-31 DIAGNOSIS — Z13.29 SCREENING FOR THYROID DISORDER: ICD-10-CM

## 2023-07-31 DIAGNOSIS — Z98.1 S/P CERVICAL SPINAL FUSION: ICD-10-CM

## 2023-07-31 DIAGNOSIS — E66.3 OVERWEIGHT (BMI 25.0-29.9): ICD-10-CM

## 2023-07-31 LAB
ALBUMIN SERPL-MCNC: 3.9 G/DL (ref 3.4–5)
ALBUMIN/GLOB SERPL: 1 {RATIO} (ref 1–2)
ALP LIVER SERPL-CCNC: 71 U/L
ALT SERPL-CCNC: 26 U/L
ANION GAP SERPL CALC-SCNC: 2 MMOL/L (ref 0–18)
AST SERPL-CCNC: 11 U/L (ref 15–37)
BASOPHILS # BLD AUTO: 0.04 X10(3) UL (ref 0–0.2)
BASOPHILS NFR BLD AUTO: 0.9 %
BILIRUB SERPL-MCNC: 0.4 MG/DL (ref 0.1–2)
BUN BLD-MCNC: 13 MG/DL (ref 7–18)
CALCIUM BLD-MCNC: 9.2 MG/DL (ref 8.5–10.1)
CHLORIDE SERPL-SCNC: 113 MMOL/L (ref 98–112)
CHOLEST SERPL-MCNC: 214 MG/DL (ref ?–200)
CO2 SERPL-SCNC: 25 MMOL/L (ref 21–32)
CREAT BLD-MCNC: 0.94 MG/DL
EGFRCR SERPLBLD CKD-EPI 2021: 73 ML/MIN/1.73M2 (ref 60–?)
EOSINOPHIL # BLD AUTO: 0.16 X10(3) UL (ref 0–0.7)
EOSINOPHIL NFR BLD AUTO: 3.6 %
ERYTHROCYTE [DISTWIDTH] IN BLOOD BY AUTOMATED COUNT: 13.1 %
FASTING PATIENT LIPID ANSWER: YES
FASTING STATUS PATIENT QL REPORTED: YES
GLOBULIN PLAS-MCNC: 4.1 G/DL (ref 2.8–4.4)
GLUCOSE BLD-MCNC: 79 MG/DL (ref 70–99)
HCT VFR BLD AUTO: 46 %
HDLC SERPL-MCNC: 74 MG/DL (ref 40–59)
HGB BLD-MCNC: 14.5 G/DL
IMM GRANULOCYTES # BLD AUTO: 0.01 X10(3) UL (ref 0–1)
IMM GRANULOCYTES NFR BLD: 0.2 %
LDLC SERPL CALC-MCNC: 117 MG/DL (ref ?–100)
LYMPHOCYTES # BLD AUTO: 1.9 X10(3) UL (ref 1–4)
LYMPHOCYTES NFR BLD AUTO: 42.4 %
MCH RBC QN AUTO: 28.6 PG (ref 26–34)
MCHC RBC AUTO-ENTMCNC: 31.5 G/DL (ref 31–37)
MCV RBC AUTO: 90.7 FL
MONOCYTES # BLD AUTO: 0.42 X10(3) UL (ref 0.1–1)
MONOCYTES NFR BLD AUTO: 9.4 %
NEUTROPHILS # BLD AUTO: 1.95 X10 (3) UL (ref 1.5–7.7)
NEUTROPHILS # BLD AUTO: 1.95 X10(3) UL (ref 1.5–7.7)
NEUTROPHILS NFR BLD AUTO: 43.5 %
NONHDLC SERPL-MCNC: 140 MG/DL (ref ?–130)
OSMOLALITY SERPL CALC.SUM OF ELEC: 289 MOSM/KG (ref 275–295)
PLATELET # BLD AUTO: 249 10(3)UL (ref 150–450)
POTASSIUM SERPL-SCNC: 4.2 MMOL/L (ref 3.5–5.1)
PROT SERPL-MCNC: 8 G/DL (ref 6.4–8.2)
RBC # BLD AUTO: 5.07 X10(6)UL
SODIUM SERPL-SCNC: 140 MMOL/L (ref 136–145)
TRIGL SERPL-MCNC: 132 MG/DL (ref 30–149)
TSI SER-ACNC: 2.62 MIU/ML (ref 0.36–3.74)
VLDLC SERPL CALC-MCNC: 23 MG/DL (ref 0–30)
WBC # BLD AUTO: 4.5 X10(3) UL (ref 4–11)

## 2023-07-31 PROCEDURE — 84443 ASSAY THYROID STIM HORMONE: CPT

## 2023-07-31 PROCEDURE — 80053 COMPREHEN METABOLIC PANEL: CPT

## 2023-07-31 PROCEDURE — 3074F SYST BP LT 130 MM HG: CPT | Performed by: INTERNAL MEDICINE

## 2023-07-31 PROCEDURE — 80061 LIPID PANEL: CPT

## 2023-07-31 PROCEDURE — 3078F DIAST BP <80 MM HG: CPT | Performed by: INTERNAL MEDICINE

## 2023-07-31 PROCEDURE — 3008F BODY MASS INDEX DOCD: CPT | Performed by: INTERNAL MEDICINE

## 2023-07-31 PROCEDURE — 87624 HPV HI-RISK TYP POOLED RSLT: CPT | Performed by: INTERNAL MEDICINE

## 2023-07-31 PROCEDURE — 88175 CYTOPATH C/V AUTO FLUID REDO: CPT | Performed by: INTERNAL MEDICINE

## 2023-07-31 PROCEDURE — 99386 PREV VISIT NEW AGE 40-64: CPT | Performed by: INTERNAL MEDICINE

## 2023-07-31 PROCEDURE — 85025 COMPLETE CBC W/AUTO DIFF WBC: CPT

## 2023-07-31 PROCEDURE — 36415 COLL VENOUS BLD VENIPUNCTURE: CPT

## 2023-08-01 LAB — HPV I/H RISK 1 DNA SPEC QL NAA+PROBE: NEGATIVE

## 2023-08-04 ENCOUNTER — TELEPHONE (OUTPATIENT)
Dept: INTERNAL MEDICINE CLINIC | Facility: CLINIC | Age: 53
End: 2023-08-04

## 2023-08-04 DIAGNOSIS — Z23 NEED FOR SHINGLES VACCINE: Primary | ICD-10-CM

## 2023-08-04 NOTE — TELEPHONE ENCOUNTER
Future Appointments   Date Time Provider Robbie Thomas   8/7/2023  4:00 PM [facility] NURSE [facility]   8/9/2023  9:20 AM H[facility] MATT RM1 [facility] [place]     Pt coming Monday for first shingles inj-please enter order

## 2023-08-09 ENCOUNTER — HOSPITAL ENCOUNTER (OUTPATIENT)
Dept: MAMMOGRAPHY | Age: 53
Discharge: HOME | End: 2023-08-09
Attending: INTERNAL MEDICINE
Payer: COMMERCIAL

## 2023-08-09 DIAGNOSIS — Z12.31 ENCOUNTER FOR SCREENING MAMMOGRAM FOR MALIGNANT NEOPLASM OF BREAST: ICD-10-CM

## 2023-08-09 PROCEDURE — 77067 SCR MAMMO BI INCL CAD: CPT | Performed by: INTERNAL MEDICINE

## 2023-08-09 PROCEDURE — 77063 BREAST TOMOSYNTHESIS BI: CPT | Performed by: INTERNAL MEDICINE

## 2024-02-19 ENCOUNTER — OFFICE VISIT (OUTPATIENT)
Dept: FAMILY MEDICINE CLINIC | Facility: CLINIC | Age: 54
End: 2024-02-19
Payer: COMMERCIAL

## 2024-02-19 VITALS — HEART RATE: 92 BPM | OXYGEN SATURATION: 98 % | TEMPERATURE: 98 F

## 2024-02-19 DIAGNOSIS — J01.00 ACUTE NON-RECURRENT MAXILLARY SINUSITIS: Primary | ICD-10-CM

## 2024-02-19 RX ORDER — AMOXICILLIN AND CLAVULANATE POTASSIUM 875; 125 MG/1; MG/1
1 TABLET, FILM COATED ORAL 2 TIMES DAILY
Qty: 20 TABLET | Refills: 0 | Status: SHIPPED | OUTPATIENT
Start: 2024-02-19 | End: 2024-02-29

## 2024-02-19 NOTE — PROGRESS NOTES
CHIEF COMPLAINT:     Chief Complaint   Patient presents with    Cold     -congestion, cough, fever, ear pain-on day 6 of this - Entered by patient       HPI:   Cheryl Balderrama is a 53 year old female who presents for sinus congestion for  7  days. Symptoms have been worsening since onset. Sinus congestion/pain is described as a pressure and is located mainly in the maxillary sinuses.  Reports thick, discolored nasal discharge. Has treated symptoms with otc benadryl and pain reliever.  Patient also reports headache, cough, fullness in ears, sore throat.  Denies fever, dental pain, tinnitus, N/V/D.      Pt has taken 2 covid tests-- last one yesterday- all neg.   Current Outpatient Medications   Medication Sig Dispense Refill    amoxicillin clavulanate 875-125 MG Oral Tab Take 1 tablet by mouth 2 (two) times daily for 10 days. 20 tablet 0    Phentermine HCl 37.5 MG Oral Tab Take 1 tablet (37.5 mg total) by mouth before breakfast. 30 tablet 1    topiramate 100 MG Oral Tab TAKE 1 TABLET BY MOUTH IN THE EVENING AFTER DINNER 90 tablet 3    topiramate (TOPAMAX) 25 MG Oral Tab Take 1 tablet (25 mg total) by mouth every evening. 90 tablet 3    desvenlafaxine ER 50 MG Oral Tablet 24 Hr Take 1 tablet (50 mg total) by mouth daily. 90 tablet 2    clonazePAM 0.5 MG Oral Tab Take 1 tablet (0.5 mg total) by mouth nightly as needed (sleep). 60 tablet 1    GABAPENTIN 300 MG Oral Cap TAKE 2 CAPSULES(600 MG) BY MOUTH TWICE DAILY AS NEEDED 120 capsule 3    Cyclobenzaprine HCl 10 MG Oral Tab Take 1 tablet (10 mg total) by mouth 3 (three) times daily as needed for Muscle spasms. 60 tablet 1    Meloxicam 15 MG Oral Tab Take 1 tablet (15 mg total) by mouth daily. 90 tablet 1      Past Medical History:   Diagnosis Date    Abnormal MRI, cervical spine     Acute pharyngitis     Acute sinusitis, unspecified     Anxiety state     Back problem     Cervical stenosis of spinal canal 10/21/2016    10.2016 mri C5-6 level concerning for cord  ischemia/chronic infarction. There is edema intrinsic to the cord extending from the C4-5 to C7 level with mild cord expansion.   There is central canal stenosis and congenitally narrowed canal most severe at the C6-7 level with AP diameter of the canal narrowed to 4.3 mm and central canal stenosis at C5-6 with AP diameter thecal sac narrowed to 4.7 mm as above.  Sp cervical fusion     Depressive disorder, not elsewhere classified     DJD (degenerative joint disease), lumbar 3/4/2017    Internal hemorrhoid, bleeding 10/19/2016    Migraines     Muscle weakness     RLE    Neuropathy     right lower leg    Other malaise and fatigue     Right sided weakness 10/20/2016    S/P cervical spinal fusion 11/1/2016    10.2016- c4-c7    Visual impairment     contact/glasses      Past Surgical History:   Procedure Laterality Date    SPINE SURGERY PROCEDURE UNLISTED  10/23/16    C4-7 ACDF, Dr. Peguero      Family History   Problem Relation Age of Onset    Alcohol and Other Disorders Associated Father         Alcoholism    Other (Other) Father     Hypertension Other         Family history of     Heart Disorder Maternal Grandmother     Stroke Maternal Grandfather     Heart Disorder Paternal Grandfather       Social History     Socioeconomic History    Marital status:    Tobacco Use    Smoking status: Never    Smokeless tobacco: Never   Substance and Sexual Activity    Alcohol use: Yes     Alcohol/week: 2.0 standard drinks of alcohol     Types: 2 Glasses of wine per week     Comment: 2 glasses wine week    Drug use: No   Other Topics Concern    Caffeine Concern Yes     Comment: 1 cup coffee daily, occ diet cola    Exercise Yes     Comment: PT 2x weekly-on hold         REVIEW OF SYSTEMS:   GENERAL: feels well otherwise, no unplanned weight change,  normal appetite  SKIN: no rashes or abnormal skin lesions  HEENT: See HPI.    LUNGS: denies shortness of breath or wheezing, See HPI  CARDIOVASCULAR: denies chest pain or palpitations    GI: denies N/V/C or abdominal pain  NEURO: + sinus headaches.  No numbness or tingling in face.    EXAM:   Pulse 92   Temp 98.2 °F (36.8 °C) (Temporal)   LMP 07/01/2023   SpO2 98%   GENERAL: well developed, well nourished,in no apparent distress  SKIN: no rashes,no suspicious lesions  HEAD: atraumatic, normocephalic, + tenderness on palpation of maxillary sinuses  EYES: conjunctiva clear, EOM intact  EARS: TM's pearly, no bulging, no retraction, no fluid, bony landmarks present  NOSE: nostrils patent, clear nasal mucous, nasal mucosa reddened and boggy  THROAT: oral mucosa pink, moist. No visible dental caries. Posterior pharynx is not erythematous. no exudates.  NECK: supple, non-tender  LUNGS: clear to auscultation bilaterally, no wheezes or rhonchi. Breathing is non labored.  CARDIO: RRR without murmur  EXTREMITIES: no cyanosis, clubbing or edema  LYMPH:  no lymphadenopathy.    NEURO:  No focal deficits      ASSESSMENT AND PLAN:     Encounter Diagnosis   Name Primary?    Acute non-recurrent maxillary sinusitis Yes       No orders of the defined types were placed in this encounter.      Meds & Refills for this Visit:  Requested Prescriptions     Signed Prescriptions Disp Refills    amoxicillin clavulanate 875-125 MG Oral Tab 20 tablet 0     Sig: Take 1 tablet by mouth 2 (two) times daily for 10 days.           Risks, benefits, side effects of medication addressed and explained.    Patient Instructions   Take antibiotics with food and plenty of water.   Eat yogurt or take probiotic daily. (Align is a good example of an OTC probiotic)  Make sure to finish the entire antibiotic treatment.  Increase fluids and rest.   Use otc meds as needed.  Monitor symptoms and contact the office if no better in 2-3 days.      The patient indicates understanding of these issues and agrees to the plan.

## 2025-01-07 ENCOUNTER — HOSPITAL ENCOUNTER (OUTPATIENT)
Dept: MRI IMAGING | Facility: HOSPITAL | Age: 55
Discharge: HOME | End: 2025-01-07
Attending: Other
Payer: COMMERCIAL

## 2025-01-07 DIAGNOSIS — G95.9 MYELOPATHY (HCC): ICD-10-CM

## 2025-01-07 PROCEDURE — 72156 MRI NECK SPINE W/O & W/DYE: CPT | Performed by: OTHER

## 2025-01-07 PROCEDURE — A9575 INJ GADOTERATE MEGLUMI 0.1ML: HCPCS | Performed by: RADIOLOGY

## 2025-01-07 RX ORDER — GADOTERATE MEGLUMINE 376.9 MG/ML
20 INJECTION INTRAVENOUS
Status: COMPLETED | OUTPATIENT
Start: 2025-01-07 | End: 2025-01-07

## 2025-01-07 RX ADMIN — GADOTERATE MEGLUMINE 18 ML: 376.9 INJECTION INTRAVENOUS at 21:38:00

## 2025-07-08 RX ORDER — PREGABALIN 50 MG/1
50 CAPSULE ORAL 2 TIMES DAILY
COMMUNITY
Start: 2024-10-01

## 2025-07-08 RX ORDER — PROGESTERONE 100 MG/1
100 CAPSULE ORAL NIGHTLY
COMMUNITY
Start: 2025-04-17

## 2025-07-08 RX ORDER — FOLIC ACID 1 MG/1
TABLET ORAL DAILY
COMMUNITY

## 2025-07-08 RX ORDER — ESTRADIOL 0.05 MG/D
1 PATCH, EXTENDED RELEASE TRANSDERMAL
COMMUNITY
Start: 2025-04-18

## 2025-07-08 RX ORDER — NALTREXONE HYDROCHLORIDE AND BUPROPION HYDROCHLORIDE 8; 90 MG/1; MG/1
2 TABLET, EXTENDED RELEASE ORAL 2 TIMES DAILY
COMMUNITY
Start: 2025-05-12

## 2025-07-14 ENCOUNTER — ANESTHESIA EVENT (OUTPATIENT)
Dept: SURGERY | Facility: HOSPITAL | Age: 55
End: 2025-07-14
Payer: COMMERCIAL

## 2025-07-14 NOTE — ANESTHESIA PREPROCEDURE EVALUATION
PRE-OP EVALUATION    Patient Name: Cheryl Balderrama    Admit Diagnosis: CERVICAL POLYP    Pre-op Diagnosis: CERVICAL POLYP    OPERATIVE HYSTEROSCOPY, POLYPECTOMY WITH TRUCLEAR    Anesthesia Procedure: OPERATIVE HYSTEROSCOPY, POLYPECTOMY WITH TRUCLEAR    Surgeons and Role:     * Carli Pineda MD - Primary    Pre-op vitals reviewed.        Body mass index is 31.64 kg/m².    Current medications reviewed.  Hospital Medications:  Current Medications[1]    Outpatient Medications:   Prescriptions Prior to Admission[2]    Allergies: Sulfa antibiotics and Shellfish-derived products      Anesthesia Evaluation    Patient summary reviewed.    Anesthetic Complications  (-) history of anesthetic complications         GI/Hepatic/Renal                                 Cardiovascular                (+) obesity                                       Endo/Other                                  Pulmonary                           Neuro/Psych      (+) depression           (+) neuromuscular disease  (+) headaches           Abnormal weight gain Allergic rhinitis  Anxiety Backache, unspecified  Cervical myelopathy (HCC) Cervical stenosis of spinal canal  DJD (degenerative joint disease), lumbar Depression  Encounter for therapeutic drug monitoring Hyperreflexia  Insomnia Insulin resistance  Internal hemorrhoid, bleeding Neuropathy  Neuropathy of right lower extremity Overweight (BMI 25.0-29.9)  S/P cervical spinal fusion Unsteadiness            Past Surgical History[3]  Social Hx on file[4]  History   Drug Use No     Available pre-op labs reviewed.               Airway      Mallampati: II  Mouth opening: >3 FB  TM distance: > 6 cm  Neck ROM: full Cardiovascular    Cardiovascular exam normal.         Dental    Dentition appears grossly intact         Pulmonary    Pulmonary exam normal.                 Other findings              ASA: 3   Plan: MAC  NPO status verified and           Plan/risks discussed with:  patient                Present on Admission:  **None**             [1]   No current facility-administered medications on file as of .   [2]   No medications prior to admission.   [3]   Past Surgical History:  Procedure Laterality Date    Spine surgery procedure unlisted  10/23/16    C4-7 ACDF, Dr. Peguero   [4]   Social History  Socioeconomic History    Marital status:    Tobacco Use    Smoking status: Never    Smokeless tobacco: Never   Vaping Use    Vaping status: Never Used   Substance and Sexual Activity    Alcohol use: Yes     Alcohol/week: 2.0 standard drinks of alcohol     Types: 2 Glasses of wine per week     Comment: 2 glasses wine week    Drug use: No   Other Topics Concern    Caffeine Concern Yes     Comment: 1 cup coffee daily, occ diet cola    Exercise Yes     Comment: PT 2x weekly-on hold

## 2025-07-14 NOTE — H&P
[facility]    PATIENT'S NAME: JIM MOYA   ATTENDING PHYSICIAN: Carli Pineda M.D.   PATIENT ACCOUNT#:   [id number]    LOCATION:  [facility]  MEDICAL RECORD #:   [id number]       YOB: 1970  ADMISSION DATE:       07/15/2025    HISTORY AND PHYSICAL EXAMINATION    CHIEF COMPLAINT:  \"I have postmenopausal bleeding.\"    HISTORY OF PRESENT ILLNESS:  The patient is a 55-year-old female,  2, para 2-0-0-2, whose last menstrual period was 04/15/2024.  She is presently on menopause hormone therapy and complains of postmenopausal bleeding.  The patient notes the bleeding has been light requiring the use of a panty liner, brown red spotting without clots.  The patient had a GYN ultrasound saline infusion sonogram performed on 2025, that noted an anteverted uterus measuring 88 x 37 x 51 mm.  The endometrial thickness was noted to be 3.5 cm, previously in March it was 4.5 cm.  The adnexa were noted to be within normal limits.  Anterior margin of the endometrium measured 2.1 mm and the posterior margin 1.4 mm.  There is a suspected cervical polyp measuring 9 x 7 x 4 mm.  These results were discussed with the patient and recommendation of operative hysteroscopy discussed and patient presently presents for above.    PAST MEDICAL HISTORY:  Anxiety, migraine headaches, neuropathy of the right lower leg.    PAST SURGICAL HISTORY:  2016, spinal fusion C4 through C7; wisdom teeth removal.    GYNECOLOGICAL HISTORY:  Menarche age 13.  History of regular menses.  No history of any sexually transmitted disease.  No history of an abnormal Pap smear.    OBSTETRICAL HISTORY:  On 2000, 40 weeks; 7-pound 13-ounce male infant; normal vaginal delivery; epidural.  Her son, Afshin, was delivered by myself, Dr. Pineda at [facility].  On 2002, 39 weeks; 8-pound 3-ounce male infant; normal vaginal delivery; epidural; no complications.  Her son, José Miguel, was delivered at [facility] by  myself, Dr. Pineda.    MEDICATIONS:  Present medications are baby aspirin 1 tab p.o. daily, vitamin B12 and folic acid, clonazepam 0.5 mg tablet as needed, venlafaxine  mg extended release 1 tab p.o. daily, phentermine 37.5 mg 1 tab p.o. daily, pregabalin 150 mg at bedtime, Prometrium 100 mg p.o. at bedtime, Vivelle-Dot 0.05 mg transdermal patch twice weekly, topiramate  mg capsule sprinkle extended release 24 hours 1 tab p.o. daily.    ALLERGIES:  She is allergic to sulfa; she gets hives.  No latex allergy.    FAMILY HISTORY:  Congestive heart failure in her mother.  Macular degeneration in her mother.    SOCIAL HISTORY:  Occasional EtOH.  No tobacco.  No drugs.    PHYSICAL EXAMINATION:  VITAL SIGNS:  The patient is 5 feet 6 inches.  She weighs 197 pounds.  Blood pressure 124/86.  HEENT:  Within normal limits.  HEART:  Regular rate and rhythm.  LUNGS:  Clear.  ABDOMEN:  Benign.  EXTREMITIES:  No CCE.  PELVIC:  BUS, external genitalia within normal limits.  Vagina, normal mucosa.  Cervix, no gross lesions.  Bimanual, no palpable abnormal masses, nontender.    ASSESSMENT:  1.   A 55-year-old female,  2, para 2-0-0-2, with complaints of postmenopausal bleeding on menopause hormone therapy.  2.   Increased body mass index.  3.   History of anxiety.  4.   History of migraine headaches.    PLAN:  The patient is scheduled for operative hysteroscopy with TruClear.  The procedure and risks of procedure were discussed in depth with the patient.  The patient states she understands the procedure and risks of procedure and consents to the above.  Alternative forms of treatment and observation were discussed and patient declines.  Preop labs done.  Cytotec discussed and prescription sent.  Postop care discussed.  All asked questions answered.  The patient is to follow up for surgery on 07/15/2025 as scheduled or call as needed.    Dictated By Carli Pineda M.D.  d: 2025 20:05:50  t: 2025  21:37:27  [facility] [id number]  [facility]/

## 2025-07-15 ENCOUNTER — HOSPITAL ENCOUNTER (OUTPATIENT)
Facility: HOSPITAL | Age: 55
Setting detail: HOSPITAL OUTPATIENT SURGERY
Discharge: HOME | End: 2025-07-15
Attending: OBSTETRICS & GYNECOLOGY | Admitting: OBSTETRICS & GYNECOLOGY
Payer: COMMERCIAL

## 2025-07-15 ENCOUNTER — ANESTHESIA (OUTPATIENT)
Dept: SURGERY | Facility: HOSPITAL | Age: 55
End: 2025-07-15
Payer: COMMERCIAL

## 2025-07-15 VITALS
DIASTOLIC BLOOD PRESSURE: 51 MMHG | SYSTOLIC BLOOD PRESSURE: 132 MMHG | OXYGEN SATURATION: 98 % | RESPIRATION RATE: 16 BRPM | HEIGHT: 66 IN | WEIGHT: 198 LBS | BODY MASS INDEX: 31.82 KG/M2 | HEART RATE: 69 BPM | TEMPERATURE: 98 F

## 2025-07-15 LAB — B-HCG UR QL: NEGATIVE

## 2025-07-15 PROCEDURE — 88305 TISSUE EXAM BY PATHOLOGIST: CPT | Performed by: OBSTETRICS & GYNECOLOGY

## 2025-07-15 PROCEDURE — 81025 URINE PREGNANCY TEST: CPT

## 2025-07-15 RX ORDER — HYDROCODONE BITARTRATE AND ACETAMINOPHEN 10; 325 MG/1; MG/1
2 TABLET ORAL ONCE AS NEEDED
Refills: 0 | Status: DISCONTINUED | OUTPATIENT
Start: 2025-07-15 | End: 2025-07-15

## 2025-07-15 RX ORDER — ACETAMINOPHEN 500 MG
1000 TABLET ORAL ONCE AS NEEDED
Status: DISCONTINUED | OUTPATIENT
Start: 2025-07-15 | End: 2025-07-15

## 2025-07-15 RX ORDER — DEXAMETHASONE SODIUM PHOSPHATE 4 MG/ML
VIAL (ML) INJECTION AS NEEDED
Status: DISCONTINUED | OUTPATIENT
Start: 2025-07-15 | End: 2025-07-15 | Stop reason: SURG

## 2025-07-15 RX ORDER — KETOROLAC TROMETHAMINE 30 MG/ML
INJECTION, SOLUTION INTRAMUSCULAR; INTRAVENOUS AS NEEDED
Status: DISCONTINUED | OUTPATIENT
Start: 2025-07-15 | End: 2025-07-15 | Stop reason: SURG

## 2025-07-15 RX ORDER — MIDAZOLAM HYDROCHLORIDE 1 MG/ML
INJECTION INTRAMUSCULAR; INTRAVENOUS AS NEEDED
Status: DISCONTINUED | OUTPATIENT
Start: 2025-07-15 | End: 2025-07-15 | Stop reason: SURG

## 2025-07-15 RX ORDER — LABETALOL HYDROCHLORIDE 5 MG/ML
5 INJECTION, SOLUTION INTRAVENOUS EVERY 5 MIN PRN
Status: DISCONTINUED | OUTPATIENT
Start: 2025-07-15 | End: 2025-07-15

## 2025-07-15 RX ORDER — CETIRIZINE HYDROCHLORIDE 5 MG/1
5 TABLET ORAL DAILY
COMMUNITY

## 2025-07-15 RX ORDER — HYDROCODONE BITARTRATE AND ACETAMINOPHEN 10; 325 MG/1; MG/1
1 TABLET ORAL ONCE AS NEEDED
Refills: 0 | Status: DISCONTINUED | OUTPATIENT
Start: 2025-07-15 | End: 2025-07-15

## 2025-07-15 RX ORDER — HYDROMORPHONE HYDROCHLORIDE 1 MG/ML
0.2 INJECTION, SOLUTION INTRAMUSCULAR; INTRAVENOUS; SUBCUTANEOUS EVERY 5 MIN PRN
Refills: 0 | Status: DISCONTINUED | OUTPATIENT
Start: 2025-07-15 | End: 2025-07-15

## 2025-07-15 RX ORDER — SODIUM CHLORIDE, SODIUM LACTATE, POTASSIUM CHLORIDE, CALCIUM CHLORIDE 600; 310; 30; 20 MG/100ML; MG/100ML; MG/100ML; MG/100ML
INJECTION, SOLUTION INTRAVENOUS CONTINUOUS
Status: DISCONTINUED | OUTPATIENT
Start: 2025-07-15 | End: 2025-07-15

## 2025-07-15 RX ORDER — NALOXONE HYDROCHLORIDE 0.4 MG/ML
80 INJECTION, SOLUTION INTRAMUSCULAR; INTRAVENOUS; SUBCUTANEOUS AS NEEDED
Status: DISCONTINUED | OUTPATIENT
Start: 2025-07-15 | End: 2025-07-15

## 2025-07-15 RX ORDER — HYDROMORPHONE HYDROCHLORIDE 1 MG/ML
0.4 INJECTION, SOLUTION INTRAMUSCULAR; INTRAVENOUS; SUBCUTANEOUS EVERY 5 MIN PRN
Refills: 0 | Status: DISCONTINUED | OUTPATIENT
Start: 2025-07-15 | End: 2025-07-15

## 2025-07-15 RX ORDER — ONDANSETRON 2 MG/ML
INJECTION INTRAMUSCULAR; INTRAVENOUS AS NEEDED
Status: DISCONTINUED | OUTPATIENT
Start: 2025-07-15 | End: 2025-07-15 | Stop reason: SURG

## 2025-07-15 RX ORDER — HYDROMORPHONE HYDROCHLORIDE 1 MG/ML
0.6 INJECTION, SOLUTION INTRAMUSCULAR; INTRAVENOUS; SUBCUTANEOUS EVERY 5 MIN PRN
Refills: 0 | Status: DISCONTINUED | OUTPATIENT
Start: 2025-07-15 | End: 2025-07-15

## 2025-07-15 RX ORDER — PROCHLORPERAZINE EDISYLATE 5 MG/ML
5 INJECTION INTRAMUSCULAR; INTRAVENOUS EVERY 8 HOURS PRN
Status: DISCONTINUED | OUTPATIENT
Start: 2025-07-15 | End: 2025-07-15

## 2025-07-15 RX ORDER — ACETAMINOPHEN 500 MG
1000 TABLET ORAL ONCE
Status: DISCONTINUED | OUTPATIENT
Start: 2025-07-15 | End: 2025-07-15 | Stop reason: HOSPADM

## 2025-07-15 RX ORDER — MIDAZOLAM HYDROCHLORIDE 1 MG/ML
1 INJECTION INTRAMUSCULAR; INTRAVENOUS EVERY 5 MIN PRN
Status: DISCONTINUED | OUTPATIENT
Start: 2025-07-15 | End: 2025-07-15

## 2025-07-15 RX ORDER — LIDOCAINE HYDROCHLORIDE 10 MG/ML
INJECTION, SOLUTION INFILTRATION; PERINEURAL AS NEEDED
Status: DISCONTINUED | OUTPATIENT
Start: 2025-07-15 | End: 2025-07-15 | Stop reason: HOSPADM

## 2025-07-15 RX ORDER — MEPERIDINE HYDROCHLORIDE 25 MG/ML
12.5 INJECTION INTRAMUSCULAR; INTRAVENOUS; SUBCUTANEOUS AS NEEDED
Refills: 0 | Status: DISCONTINUED | OUTPATIENT
Start: 2025-07-15 | End: 2025-07-15

## 2025-07-15 RX ORDER — ONDANSETRON 2 MG/ML
4 INJECTION INTRAMUSCULAR; INTRAVENOUS EVERY 6 HOURS PRN
Status: DISCONTINUED | OUTPATIENT
Start: 2025-07-15 | End: 2025-07-15

## 2025-07-15 RX ORDER — SCOPOLAMINE 1 MG/3D
1 PATCH, EXTENDED RELEASE TRANSDERMAL ONCE
Status: DISCONTINUED | OUTPATIENT
Start: 2025-07-15 | End: 2025-07-15 | Stop reason: HOSPADM

## 2025-07-15 RX ORDER — VASOPRESSIN 20 [USP'U]/ML
INJECTION, SOLUTION INTRAVENOUS AS NEEDED
Status: DISCONTINUED | OUTPATIENT
Start: 2025-07-15 | End: 2025-07-15 | Stop reason: HOSPADM

## 2025-07-15 RX ADMIN — MIDAZOLAM HYDROCHLORIDE 2 MG: 1 INJECTION INTRAMUSCULAR; INTRAVENOUS at 07:10:00

## 2025-07-15 RX ADMIN — ONDANSETRON 4 MG: 2 INJECTION INTRAMUSCULAR; INTRAVENOUS at 07:10:00

## 2025-07-15 RX ADMIN — SODIUM CHLORIDE, SODIUM LACTATE, POTASSIUM CHLORIDE, CALCIUM CHLORIDE: 600; 310; 30; 20 INJECTION, SOLUTION INTRAVENOUS at 07:56:00

## 2025-07-15 RX ADMIN — DEXAMETHASONE SODIUM PHOSPHATE 4 MG: 4 MG/ML VIAL (ML) INJECTION at 07:10:00

## 2025-07-15 RX ADMIN — KETOROLAC TROMETHAMINE 30 MG: 30 INJECTION, SOLUTION INTRAMUSCULAR; INTRAVENOUS at 07:40:00

## 2025-07-15 NOTE — ANESTHESIA POSTPROCEDURE EVALUATION
[facility]    Cheryl Balderrama Patient Status:  Hospital Outpatient Surgery   Age/Gender 55 year old female MRN [id number]   Location [facility] SURGERY Attending Carli Pineda MD   Hosp Day # 0 PCP Shy Edmondson MD       Anesthesia Post-op Note    OPERATIVE HYSTEROSCOPY, POLYPECTOMY WITH TRUCLEAR    Procedure Summary       Date: 07/15/25 Room / Location:  MAIN OR [address] [place] [address] MAIN OR    Anesthesia Start: 0706 Anesthesia Stop: 0756    Procedure: OPERATIVE HYSTEROSCOPY, POLYPECTOMY WITH TRUCLEAR (Uterus) Diagnosis: (CERVICAL POLYP)    Surgeons: Carli Pineda MD Anesthesiologist: Watson Brown MD    Anesthesia Type: MAC ASA Status: 3            Anesthesia Type: MAC    Vitals Value Taken Time   BP  07/15/25 07:57   Temp 97.3 °F (36.3 °C) 07/15/25 07:56   Pulse 71 07/15/25 07:56   Resp 16 07/15/25 07:56   SpO2 97 % 07/15/25 07:56           Patient Location: PACU    Anesthesia Type: general    Airway Patency: patent    Postop Pain Control: adequate    Mental Status: mildly sedated but able to meaningfully participate in the post-anesthesia evaluation    Nausea/Vomiting: none    Cardiopulmonary/Hydration status: stable euvolemic    Complications: no apparent anesthesia related complications    Postop vital signs: stable    Dental Exam: Unchanged from Preop    Patient to be discharged home when criteria met.

## 2025-07-15 NOTE — BRIEF OP NOTE
[facility]  Post-Op Procedure Note    Cheryl Balderrama Patient Status:  Hospital Outpatient Surgery    1970 MRN [id number]   Location [facility] SURGERY Attending Carli Pineda MD   Hosp Day # 0 PCP Shy Edmondson MD     Preoperative Diagnosis: POSTMENOPAUSAL BLEEDING, ENDOCERVICAL POLYP  Postoperative Diagnosis: POSTMENOPAUSAL BLEEDING, ENDOCERVICAL POLYP,   Procedure: OPERATIVE HYSTEROSCOPY, POLYPECTOMY WITH TRUCLEAR     Primary surgeon: Carli Pineda MD  Surgical Findings: Thickened endometrium, endocervical polyp  Anesthesia: MAC- Dr Hamilton; LOCAL- Dr Pineda  Anesthesiologist: Watson Brown MD  Antibiotics:   Anti-Infectives            ceFAZolin (Ancef) 2g in 10mL IV syringe premix (Completed)          IV Fluids: 600 ml  Hysteroscopic Deficit: 100 ml  Urine Output: straight cath not done   Estimated blood loss: 5 ml  Counts: Correct  Specimen: Endometrial biopsies, endocervical polyp  Complications: None.  Patient tolerated the procedure well.  Condition: To same day surgery in stable condition.       Carli Pineda MD   7/15/2025  7:55 AM

## 2025-07-15 NOTE — INTERVAL H&P NOTE
Pre-op Diagnosis: CERVICAL POLYP    The above referenced H&P was reviewed by Carli Pineda MD on 7/15/2025, the patient was examined and no significant changes have occurred in the patient's condition since the H&P was performed.  I discussed with the patient and/or legal representative the potential benefits, risks and side effects of this procedure; the likelihood of the patient achieving goals; and potential problems that might occur during recuperation.  I discussed reasonable alternatives to the procedure, including risks, benefits and side effects related to the alternatives and risks related to not receiving this procedure.  We will proceed with procedure as planned.

## 2025-07-15 NOTE — DISCHARGE INSTRUCTIONS
Discharge Instructions for Hysteroscopy  Dr TERA Pineda  Initial 24 hours post-procedure:  Take it easy. If possible, stay at home. Have someone at home to assist you or to report any problems or symptoms that you may have.  You may feel weak, dizzy, or lightheaded from the anesthesia. If so, stay in bed. Avoid stairs. If you must use stairs, be sure to have assistance.  You may have cramping and/or lower abdominal pain. If so, take 2 to 3 Advil every 4 - 6 hours. If the pain persists or worsens, call our office immediately.  General Post Procedure:  Take your temperature once a day for the next 7 days. If your temperature exceeds 100.4, call our office immediately.  You may have spotting or discharge for the next few days. Spotting or discharge may continue for a couple of weeks.  You may pass an occasional blood clot. If you continue to pass numerous large clothes, call our office immediately.   If you experience nausea, vomiting, or bloating, or if you are unable to eat or unable to pass gas, call our office immediately.  Do not douche, use tampons, or have intercourse for 10 days.  Please call our office to schedule your post-op appointment for 7 - 10 days post procedure, with the physician who performed your Hysteroscopy or, if you have any questions or concerns prior to your appointment, [phone].  You received a drug called Toradol which is an Anti Inflammatory at:7:40 am  If you are allowed to take Anti inflammatories:    Do not take any Anti Inflammatory like Motrin, Aleve or Ibuprophen until after: 1:40 pm  Please report any suspected allergic reactions or bleeding issues to your doctor

## 2025-07-15 NOTE — OPERATIVE REPORT
[facility]    PATIENT'S NAME: JIM MOYA   ATTENDING PHYSICIAN: Carli Pineda M.D.   OPERATING PHYSICIAN: Carli Pineda M.D.   PATIENT ACCOUNT#:   [id number]    LOCATION:  [facility] 6 EDWP 10  MEDICAL RECORD #:   [id number]       YOB: 1970  ADMISSION DATE:       07/15/2025      OPERATION DATE:  07/15/2025    OPERATIVE REPORT    PREOPERATIVE DIAGNOSIS:  Postmenopausal bleeding, endocervical polyp.  POSTOPERATIVE DIAGNOSIS:  Postmenopausal bleeding, endocervical polyp.  PROCEDURE:  Operative hysteroscopy with Truclear.    OPERATIVE TECHNIQUE:  The patient was brought into the operating room, placed in the dorsal supine position.  IV anesthesia was initiated by Dr. Brown.  The patient was placed in the dorsal lithotomy position and prepped and draped in the normal sterile fashion.  Ancef 2 g IV piggyback was given prior to the procedure.  A speculum was placed inside the vagina, and the cervix was grasped with a tenaculum.  The uterus was sounded to 8 cm.  Then, 20 mL of lidocaine Pitressin solution was injected into the cervix.  The cervix was dilated to #7 Hegar dilator secondary to some difficulty in entering the endometrial cavity.  The hysteroscope was then placed inside the uterine cavity, and the uterine cavity was visualized.  There was some thickened endometrium, both posteriorly and anteriorly.  Soft tissue morcellator was used to biopsy the endometrium thoroughly.  Both ostia were visualized and noted to be within normal limits.  The remainder of the cavity was noted to be within normal limits.  There was an endocervical polyp which was noted high in the endocervix.  This was removed with the soft tissue morcellator as well.  The hysteroscope was removed from the endometrial cavity.  The uterus was resounded to 8 cm.  The tenaculum was removed from the anterior lip of the cervix, and the speculum was removed from the vagina.  Input for the procedure was  600 mL of IV fluid.  Straight cath was not performed.  Estimated blood loss is 5 mL.  Hysteroscopic deficit 100 mL.  Specimens, endometrial biopsies, and endocervical polyp were sent to Pathology for evaluation.  Counts were correct.  Complications none.  The patient tolerated the procedure well, and she went to same-day surgery in stable condition.    Dictated By Carli Pineda M.D.  d: 07/15/2025 08:01:11  t: 07/15/2025 10:11:36  [facility] [id number]  [facility]/

## 2025-08-05 ENCOUNTER — OFFICE VISIT (OUTPATIENT)
Dept: OTHER | Facility: HOSPITAL | Age: 55
End: 2025-08-05
Attending: PREVENTIVE MEDICINE

## 2025-08-05 DIAGNOSIS — Z01.84 IMMUNITY STATUS TESTING: ICD-10-CM

## 2025-08-05 DIAGNOSIS — Z11.1 SCREENING-PULMONARY TB: Primary | ICD-10-CM

## 2025-08-05 LAB
RUBV IGG SER QL: POSITIVE
RUBV IGG SER-ACNC: 247 IU/ML (ref 10–?)

## 2025-08-05 PROCEDURE — 86735 MUMPS ANTIBODY: CPT | Performed by: PREVENTIVE MEDICINE

## 2025-08-05 PROCEDURE — 86765 RUBEOLA ANTIBODY: CPT | Performed by: PREVENTIVE MEDICINE

## 2025-08-05 PROCEDURE — 86480 TB TEST CELL IMMUN MEASURE: CPT | Performed by: PREVENTIVE MEDICINE

## 2025-08-05 PROCEDURE — 86787 VARICELLA-ZOSTER ANTIBODY: CPT | Performed by: PREVENTIVE MEDICINE

## 2025-08-05 PROCEDURE — 86762 RUBELLA ANTIBODY: CPT | Performed by: PREVENTIVE MEDICINE

## 2025-08-06 LAB
MEV IGG SER-ACNC: 10.1 AU/ML (ref 16.5–?)
MUV IGG SER IA-ACNC: 6.4 AU/ML (ref 11–?)
VZV IGG SER IA-ACNC: 3.65 (ref 1–?)

## 2025-08-07 LAB
M TB IFN-G CD4+ T-CELLS BLD-ACNC: 0.04 IU/ML
M TB TUBERC IFN-G BLD QL: NEGATIVE
M TB TUBERC IGNF/MITOGEN IGNF CONTROL: >10 IU/ML
QFT TB1 AG MINUS NIL: 0 IU/ML
QFT TB2 AG MINUS NIL: 0.01 IU/ML

## (undated) DEVICE — SOLUTION IRRIG 3000ML 0.9% NACL FLX CONT

## (undated) DEVICE — SPECIMEN SOCK - STANDARD: Brand: MEDI-VAC

## (undated) DEVICE — SOLUTION IRRIG 1000ML 0.9% NACL USP BTL

## (undated) DEVICE — HYSTEROSCOPIC OUTFLOW TUBE SET

## (undated) DEVICE — 2000CC GUARDIAN II: Brand: GUARDIAN

## (undated) DEVICE — SKN PREP SPNG STKS PVP PNT STR: Brand: MEDLINE INDUSTRIES, INC.

## (undated) DEVICE — CATHETER,URETHRAL,REDRUBBER,STRL,16FR: Brand: MEDLINE

## (undated) DEVICE — ELITE HYSTEROSCOPE SEAL: Brand: TRUCLEAR

## (undated) DEVICE — PACK GYNE CUSTOM

## (undated) DEVICE — GLOVE SUR 5.5 SENSICARE PI PIP CRM PWD F

## (undated) DEVICE — NEEDLE SPNL 22GA L3.5IN BLK QNCKE STYL DISP

## (undated) DEVICE — MEDI-VAC NON-CONDUCTIVE SUCTION TUBING: Brand: CARDINAL HEALTH

## (undated) DEVICE — SET TB INFLO FOR TRUCLEAR SYS HYSTEROLUX

## (undated) DEVICE — SOFT TISSUE SHAVER MINI: Brand: TRUCLEAR

## (undated) DEVICE — DRAPE,TOWEL,LARGE,INVISISHIELD: Brand: MEDLINE

## (undated) DEVICE — SLEEVE COMPR MD KNEE LEN SGL USE KENDALL SCD

## (undated) NOTE — LETTER
11/08/17    To Whom it may concern:     This letter has been written at the patient's request. The above patient is under the care of a provider at the [facility] for treatment of a medical condition and was seen on 11/6/2017.       It i

## (undated) NOTE — LETTER
Date: 11/8/2017    Patient Name: Brittnee Méndez          To Whom it may concern: This letter has been written at the patient's request. The above patient was seen at the [facility] for treatment of a medical condition on 11/6/17.     This p

## (undated) NOTE — LETTER
2017       Patient Name: Melissa Lopez  : 72            To Whom it may concern:     This letter has been written at the patient's request. The above patient is under the care of a provider at the [facility] for treatment

## (undated) NOTE — MR AVS SNAPSHOT
[facility]  [address] [place] [id number] [phone]               Thank you for choosing us for your health care visit with Vinayak Singh PT.   We are glad to serve you and happy to provide you with this summar be advised that they will not be able to accompany the child in the testing room. Parents will remain in the MRI waiting area and be reunited with the child upon completion of the MRI.             Feb 27, 2017  3:30 PM   PT VISIT BY THERAPIST with Golden Edward Summaries. If you've been to the Emergency Department or your doctor's office, you can view your past visit information in Vitaldent by going to Visits < Visit Summaries. Vitaldent questions? Call [phone] for help.   Vital[employer] is NOT to be used for urge

## (undated) NOTE — MR AVS SNAPSHOT
[facility]  [address] [place], [place] [id number] [phone]               Thank you for choosing us for your health care visit with Porter Ricketts PT.   We are glad to serve you and happy to provide you with this summar Current Medications          This list is accurate as of: 1/11/17  4:24 PM.  Always use your most recent med list.                acetaminophen 500 MG Tabs   Take 500 mg by mouth every 6 (six) hours as needed for Pain.    Commonly known as:  [id number]

## (undated) NOTE — MR AVS SNAPSHOT
[facility], [address] [id number]               Thank you for choosing us for your health care visit with Kori Christensen. Christie Huntley.   We are glad to serve you and happy to provide you with this Post-operative state    -  Primary    Spinal cord injury at C5-C7 level without injury of spinal bone, subsequent encounter        Radiculopathy of lumbosacral region        Spondylolisthesis, grade 3          Instructions and Information about Your Healt approval may take 3-10 days. It is highly recommended patients contact their insurance carrier directly to determine coverage. If test is done without insurance authorization, patient may be responsible for the entire amount billed.       Precertification Summaries. If you've been to the Emergency Department or your doctor's office, you can view your past visit information in Diaspora by going to Visits < Visit Summaries. Diaspora questions? Call [phone] for help.   [employer]pora is NOT to be used for urge

## (undated) NOTE — MR AVS SNAPSHOT
[facility]  [address] [place] [address]  [phone]               Thank you for choosing us for your health care visit with Rebekah Hogan PT.   We are glad to serve you and happy to provide you with this summar This list is accurate as of: 2/27/17  7:32 PM.  Always use your most recent med list.                Desvenlafaxine Succinate ER 50 MG Tb24   Take 50 mg by mouth daily.    Commonly known as:  PRISTIQ           Orphenadrine Citrate  MG Tb12   Take 100

## (undated) NOTE — Clinical Note
2/10/2017         Patient Name: Kuldip Boswell  : 72        To Whom it may concern:     This letter has been written at the patient's request. The above patient was seen at the [facility] for treatment of a medical condition on

## (undated) NOTE — Clinical Note
Patient Name: Clinton Cruz  YOB: 1970          MRN number:  [id number]  Date:  4/4/2017  Referring Physician:  Kat Marvin    Dear Dr. Carrie Millan,    Discharge Summary  Pt has attended 14, cancelled 1, and no shown 0 visits in Physical Therapy symptoms in the LE for which she is following up with MD. Pt is independent with her HEP and is plateau in progression.      Goals:   ·  Pt will improve cervical AROM flexion by 45 degrees to improve tolerance for looking down to tie shoes (10visits) Met  ·

## (undated) NOTE — MR AVS SNAPSHOT
[facility]  [address] [place] [address]  [phone]               Thank you for choosing us for your health care visit with Nuno Taylor PT.   We are glad to serve you and happy to provide you with this summar supplements, please call the ordering physician to see if that medication can be held prior to procedure.              Feb 08, 2017  2:00 PM   Follow up with ANTONIO Law 118, [address] ([facility] Enter your [email or web address] Activation Code exactly as it appears below along with your Zip Code and Date of Birth to complete the sign-up process. If you do not sign up before the expiration date, you must request a new code.     Your unique [email or web address] Access Code:

## (undated) NOTE — MR AVS SNAPSHOT
[facility] [address] [facility]  [address] [facility]  [place] [address]  [phone]               Thank you for choosing us for your health care visit with AICHA Roberts. We are glad to serve you and happy to provide you with this summary of your visit.   Please he · Take the full course of antibiotics as instructed. Do not stop taking them, even if you feel better. · Drink plenty of water, hot tea, and other liquids. This may help thin mucus. It also may promote sinus drainage.   · Heat may help soothe painful areas Call your healthcare provider if any of these occur:  · Facial pain or headache becoming more severe  · Stiff neck  · Unusual drowsiness or confusion  · Swelling of the forehead or eyelids  · Vision problems, including blurred or double vision  · Fever of  You can access your MyChart to more actively manage your health care and view more details from this visit by going to [email or web address].   If you've recently had a stay at the Hospital you can access your discharge instructions in [address] by bonnie

## (undated) NOTE — MR AVS SNAPSHOT
[facility] [address]  [address] [facility]  [phone]               Thank you for choosing us for your health care visit with Sona Garza MD.  We are glad to serve you and happy to provide you with t Instructions and Information about Your Health     None      Allergies as of Mar 04, 2017     Sulfa Antibiotics Hives    Shellfish-Derived Products Hives    Stomach problems                Today's Vital Signs     BP Pulse Height Weight BMI    124/80 mmHg 6

## (undated) NOTE — MR AVS SNAPSHOT
[facility]  [address] [place] [id number] [phone]               Thank you for choosing us for your health care visit with Cecile Dudley PT.   We are glad to serve you and happy to provide you with this summar Current Medications          This list is accurate as of: 1/18/17  4:38 PM.  Always use your most recent med list.                acetaminophen 500 MG Tabs   Take 500 mg by mouth every 6 (six) hours as needed for Pain.    Commonly known as:  [address]

## (undated) NOTE — MR AVS SNAPSHOT
[facility]  [address] [place] [address]  [phone]               Thank you for choosing us for your health care visit with Rory Guillory PT.   We are glad to serve you and happy to provide you with this summar Current Medications          This list is accurate as of: 1/9/17  6:45 PM.  Always use your most recent med list.                acetaminophen 500 MG Tabs   Take 500 mg by mouth every 6 (six) hours as needed for Pain.    Commonly known as:  [address] [place]

## (undated) NOTE — Clinical Note
Date: 4/19/2017    Patient Name: Faith Tovar          To Whom it may concern:     This letter has been written at the patient's request. The above patient is under the care of a provider at the [facility] for treatment of a medical con

## (undated) NOTE — MR AVS SNAPSHOT
[address] [address]               Thank you for choosing us for your health care visit with Sabra Knott. Sharon Benton.   We are glad to serve you and happy to provide you with this ? Please allow the office 48-72 hours to fill the prescription. ? Patient must present photo ID at time of .   If a designated family member will be picking up prescription, office must be given name of individual in advance and they must present a [phone]            Jan 26, 2017  1:00 PM   PT VISIT BY THERAPIST with Julissa Henriquez, PT   [facility] in [facility])    [address] [facility]            Jan 30, 2017  3 Your physician has ordered a radiology test that may require authorization from your insurance company.   Your physician or the clinic staff will work with your insurance company to obtain this authorization for your ordered radiology test.    To schedule a

## (undated) NOTE — MR AVS SNAPSHOT
[facility], [address] [address]               Thank you for choosing us for your health care visit with Jenny Alfred. Zhou Arnold.   We are glad to serve you and happy to provide you with this ? Patient must present photo ID at time of . If a designated family member will be picking up prescription, office must be given name of individual in advance and they must present an ID as well. ?  The name of the person picking up your prescripti PT VISIT BY THERAPIST with Portia Hagen PT   [facility] in [facility] HEART AND SURGICAL [facility])    [address] [facility]            Jan 25, 2017  2:30 PM   Consult with Peder Beauvais, MD Sheryle Gaudy [G95.9], DDD (degenerative disc disease), cervical [M50.30]                 Referral Details     Referred By    Referred To    AICHA Monaco   [address] [facility]   [address] [place] [address]   Phone:  [phone]    Diagnoses:  S/P cervical spin OP REFERRAL TO [facility] & Jonatan  [[id number] CUSTOM]  Order #:  [id number]         **THIS IS NOT A REFERRAL**  Your physician has recommended you to [facility].   If your insurance requires you to obtain a managed care referral, pl Enter your sickweather Activation Code exactly as it appears below along with your Zip Code and Date of Birth to complete the sign-up process. If you do not sign up before the expiration date, you must request a new code.     Your unique sickweather Access Code: [email or web address]

## (undated) NOTE — MR AVS SNAPSHOT
[address], [address] [place] [address]. [place] [id number] [phone]               Thank you for choosing us for your health care visit with Geraldo Guadarrama DO.   We are glad to serve you and happy to provide you with th ? Patient must present photo ID at time of . If a designated family member will be picking up prescription, office must be given name of individual in advance and they must present an ID as well. ?  The name of the person picking up your prescripti A nurse will be calling you to do a brief Health History Screen over the phone and give you any further instructions if needed. This nurse will also be able to assist you should additional testing be required before your procedure.   Please arrive on time [address]   [phone]              Allergies as of Jan 26, 2017     Sulfa Antibiotics Hives    Shellfish-Derived Products Hives    Stomach problems                Today's Vital Signs     BP Pulse Height Weight BMI    118 Assoc Dx:  Cervical spondylosis with myelopathy [M47.12]           CSF culture    Complete by:  Jan 26, 2017 (Approximate)    Assoc Dx:  Cervical spondylosis with myelopathy [M47.12], Weakness of right lower extremity [R29.898]           Viral culture, no You can access your MyChart to more actively manage your health care and view more details from this visit by going to [email or web address].   If you've recently had a stay at the Hospital you can access your discharge instructions in [address] by bonnie

## (undated) NOTE — MR AVS SNAPSHOT
[place] [address]  [phone]               Thank you for choosing us for your health care visit with Wesly Mejia PT.   We are glad to serve you and happy to provide you with this summar time of your appointment. FOR FEMALES HAVING GADOLINIUM EXAMS :If you are breastfeeding and your exam requires an IV Contrast (Gadolinium) injection, you need to stop breastfeeding for 24-48 hours after the procedure.      IF A CHILD is scheduled for this Summaries. If you've been to the Emergency Department or your doctor's office, you can view your past visit information in [employer] by going to Visits < Visit Summaries. [employer] questions? Call [phone] for help.   [employer] is NOT to be used for urge

## (undated) NOTE — MR AVS SNAPSHOT
[facility], [address] [address]               Thank you for choosing us for your health care visit with Trinity Urbano. Rafa Duncan.   We are glad to serve you and happy to provide you with this ? Patient must present photo ID at time of . If a designated family member will be picking up prescription, office must be given name of individual in advance and they must present an ID as well. ?  The name of the person picking up your prescripti PT VISIT BY THERAPIST with Nuria Calderon, PT   [facility] in [facility] HEART AND SURGICAL [facility])    [address]   [phone]            Feb 27, 2017  3:30 PM   PT VISIT BY THERAPIST with Mary Boston Your physician has ordered a radiology test that may require authorization from your insurance company.   Your physician or the clinic staff will work with your insurance company to obtain this authorization for your ordered radiology test.    To schedule a

## (undated) NOTE — IP AVS SNAPSHOT
[facility] [place] [address] [facility], [address] ~ [phone]                Discharge Summary   2/2/2017    Ms. Jazmine Holguin           Admission Information        Provider Department    2/2/2017 DO Eric Thomas Xray Additional Instructions    Instructions and Information about Your Health     None      Future Appointments        Provider Department    2/6/2017 4:00 PM [facility]    2/8/2017 2:00 PM Vearl Bronancy [facility], [facility] and ask to get set up for an insurance coverage that is in-network with Nitin Christianson.         Visit Information        Provider Department Dept Phone    2/2/2017 10:00 AM [facility] [phone]         We want to hear from you       W

## (undated) NOTE — MR AVS SNAPSHOT
[facility]  [address] [place] [address] [phone]               Thank you for choosing us for your health care visit with Kelly Beltran PT.   We are glad to serve you and happy to provide you with this summar This list is accurate as of: 3/6/17  7:31 PM.  Always use your most recent med list.                Desvenlafaxine Succinate ER 50 MG Tb24   Take 50 mg by mouth daily.    Commonly known as:  PRISTIQ           Orphenadrine Citrate  MG Tb12   Take 100 m

## (undated) NOTE — Clinical Note
Date: 1/4/2017    Patient Name: John Merlos          To Whom it may concern: This letter has been written at the patient's request. The above patient was seen at the [facility] for treatment of a medical condition.     The patient may re

## (undated) NOTE — MR AVS SNAPSHOT
[facility], [address], [address] [place] [phone]               Thank you for choosing us for your health care visit with Denis Haynes MD.  We are glad to serve you and happy to provide you with this ? Patient must present photo ID at time of . If a designated family member will be picking up prescription, office must be given name of individual in advance and they must present an ID as well. ?  The name of the person picking up your prescripti phone and give you any further instructions if needed. This nurse will also be able to assist you should additional testing be required before your procedure. Please arrive on time for your lab appointment.  This will begin the process of your procedure (6 please consult with your physician prior to your exam.  To ENSURE YOUR SAFETY: If you have a medical implant device it is extremely important to present documentation of the implant information at time of your appointment.   FOR FEMALES HAVING GADOLINIUM EX Take 100 mg by mouth 2 (two) times daily as needed. What changed:  Another medication with the same name was removed. Continue taking this medication, and follow the directions you see here.                    MyChart     Visit MyChart  You can access you

## (undated) NOTE — IP AVS SNAPSHOT
[facility] [place] [address] [place], [address] ~ [phone]                Discharge Summary   2/16/2017    Ms. Walters Leupp           Admission Information        Provider Department    2/16/2017 Anuj Simon MD  [address]   [phone]            Feb 22, 2017  3:30 PM   PT VISIT BY THERAPIST with Gerri Mcdaniel, PT   [facility] in [place] ([facility] SURGICAL [facility])    [address] Your physician or the clinic staff will work with your insurance company to obtain this authorization for your ordered radiology test.    To schedule an appointment for your radiology test please call [address] Alvar[address] Scheduling   at [phone]. 8.0 (02/16/17)  4.1 (02/16/17)  141 (02/16/17)  3.8 (02/16/17)  104      Radiology Exams     None         Additional Information       We are concerned for your overall well being:    - If you are a smoker or have smoked in the last 12 months, we encourage provide you with additional printed information. Not all patients will experience these side effects or respond to medications the same. Please call your provider or healthcare team if you have any questions regarding your medications while at home.

## (undated) NOTE — Clinical Note
Date: 2017    Patient Name: Aysha Last  : 1970        To Whom it may concern:     This letter has been written at the patient's request. The above patient is under the care of a provider at the [facility] for treatment of a

## (undated) NOTE — MR AVS SNAPSHOT
[facility]  [address] [place] [address]  [phone]               Thank you for choosing us for your health care visit with Edwige Lucio PT.   We are glad to serve you and happy to provide you with this summar for giving you a prescription for oral medication which you would fill at your local pharmacy. Please follow your doctor's instructions when taking oral sedation.   If you will be taking oral sedation, you must bring a  who will drive you home (the  Summaries. If you've been to the Emergency Department or your doctor's office, you can view your past visit information in [employer] by going to Visits < Visit Summaries. [employer] questions? Call [phone] for help.   [employer] is NOT to be used for urge

## (undated) NOTE — MR AVS SNAPSHOT
[facility]  [address] [place], [place] [address] [phone]               Thank you for choosing us for your health care visit with Rafia Velez PT.   We are glad to serve you and happy to provide you with this summar This list is accurate as of: 3/1/17  5:53 PM.  Always use your most recent med list.                Desvenlafaxine Succinate ER 50 MG Tb24   Take 50 mg by mouth daily.    Commonly known as:  PRISTIQ           Orphenadrine Citrate  MG Tb12   Take 100 m

## (undated) NOTE — LETTER
03/21/18        [address] [facility] [address]      Dear Kenia Finch records indicate that you have outstanding lab work and or testing that was ordered for you and has not yet been completed:          CBC W Differential W

## (undated) NOTE — MR AVS SNAPSHOT
[facility], [address] [address]               Thank you for choosing us for your health care visit with Clary Ziegler. Sue Tamayo.   We are glad to serve you and happy to provide you with this Your physician or the clinic staff will work with your insurance company to obtain this authorization for your ordered radiology test.    To schedule an appointment for your radiology test please call Alexandro Leon Scheduling   at [phone]. ? Please allow the office 48-72 hours to fill the prescription. ? Patient must present photo ID at time of .       Scheduling Tests    If your physician has ordered radiology tests such as MRI or CT scans, do not schedule the test until this office Take 1 capsule by mouth daily. MyChart     Visit MyChart  You can access your MyChart to more actively manage your health care and view more details from this visit by going to [email or web address].   If you've recently had a stay Don’t forget strength training with weights and resistance Set goals and track your progress   You don’t need to join a gym. Home exercises work great.  Put more priority on exercise in your life                    Visit [facility] online at

## (undated) NOTE — Clinical Note
Patient Name: Shikha Talavera  YOB: 1970          MRN number:  [id number]  Date:  4/4/2017  Referring Physician:  Cornelio Patel    Discharge Summary  Initial Functional Outcome Score ***/100  Final Functional Outcome Score ***/100  Number of unstable surface. She is able to lift 10 Ibs overhead w/o any radicular or referred symptoms in the upper extremity.  Pt has tingling and radiating symptoms in the LE for which she is following up with MD. Pt is independent with her HEP and is plateau in pr

## (undated) NOTE — MR AVS SNAPSHOT
[facility]  [address] [place] [id number] [phone]               Thank you for choosing us for your health care visit with Nuno Taylor PT.   We are glad to serve you and happy to provide you with this summar Current Medications          This list is accurate as of: 1/16/17 10:56 AM.  Always use your most recent med list.                acetaminophen 500 MG Tabs   Take 500 mg by mouth every 6 (six) hours as needed for Pain.    Commonly known as:  [address]

## (undated) NOTE — MR AVS SNAPSHOT
[facility]  [address] [place] [address] [phone]               Thank you for choosing us for your health care visit with Amina Mims PT.   We are glad to serve you and happy to provide you with this summar recent med list.                acetaminophen 500 MG Tabs   Take 500 mg by mouth every 6 (six) hours as needed for Pain.    Commonly known as:  TYLENOL EXTRA STRENGTH           Orphenadrine Citrate  MG Tb12   Take 100 mg by mouth 2 (two) times daily a

## (undated) NOTE — Clinical Note
Date: 4/19/2017    Patient Name: Ivett Sofia          To Whom it may concern:     This letter has been written at the patient's request. The above patient is under the care of a provider at the [facility] for treatment of a medical con